# Patient Record
Sex: FEMALE | Race: OTHER | Employment: FULL TIME | URBAN - METROPOLITAN AREA
[De-identification: names, ages, dates, MRNs, and addresses within clinical notes are randomized per-mention and may not be internally consistent; named-entity substitution may affect disease eponyms.]

---

## 2017-09-22 DIAGNOSIS — R56.9 CONVULSIONS, UNSPECIFIED CONVULSION TYPE (HCC): ICD-10-CM

## 2017-09-22 RX ORDER — LEVETIRACETAM 500 MG/1
1000 TABLET ORAL 2 TIMES DAILY
Qty: 120 TABLET | Refills: 5 | Status: SHIPPED | OUTPATIENT
Start: 2017-09-22 | End: 2018-06-11 | Stop reason: SDUPTHER

## 2017-10-20 ENCOUNTER — OFFICE VISIT (OUTPATIENT)
Dept: FAMILY MEDICINE CLINIC | Age: 27
End: 2017-10-20

## 2017-10-20 ENCOUNTER — TELEPHONE (OUTPATIENT)
Dept: FAMILY MEDICINE CLINIC | Age: 27
End: 2017-10-20

## 2017-10-20 VITALS
DIASTOLIC BLOOD PRESSURE: 82 MMHG | WEIGHT: 226 LBS | HEIGHT: 67 IN | BODY MASS INDEX: 35.47 KG/M2 | HEART RATE: 72 BPM | SYSTOLIC BLOOD PRESSURE: 124 MMHG | RESPIRATION RATE: 14 BRPM | TEMPERATURE: 98.6 F

## 2017-10-20 DIAGNOSIS — N91.5 OLIGOMENORRHEA, UNSPECIFIED TYPE: ICD-10-CM

## 2017-10-20 DIAGNOSIS — Z30.9 ENCOUNTER FOR CONTRACEPTIVE MANAGEMENT, UNSPECIFIED TYPE: ICD-10-CM

## 2017-10-20 DIAGNOSIS — R56.9 SEIZURES (HCC): Primary | ICD-10-CM

## 2017-10-20 DIAGNOSIS — R56.9 SEIZURES (HCC): ICD-10-CM

## 2017-10-20 DIAGNOSIS — J30.9 ALLERGIC RHINITIS, UNSPECIFIED CHRONICITY, UNSPECIFIED SEASONALITY, UNSPECIFIED TRIGGER: Primary | ICD-10-CM

## 2017-10-20 LAB
ALBUMIN SERPL-MCNC: 4.7 G/DL (ref 3.9–4.9)
ALP BLD-CCNC: 73 U/L (ref 40–130)
ALT SERPL-CCNC: 10 U/L (ref 0–33)
ANION GAP SERPL CALCULATED.3IONS-SCNC: 17 MEQ/L (ref 7–13)
AST SERPL-CCNC: 16 U/L (ref 0–35)
BILIRUB SERPL-MCNC: 0.3 MG/DL (ref 0–1.2)
BUN BLDV-MCNC: 11 MG/DL (ref 6–20)
CALCIUM SERPL-MCNC: 9.9 MG/DL (ref 8.6–10.2)
CHLORIDE BLD-SCNC: 103 MEQ/L (ref 98–107)
CO2: 24 MEQ/L (ref 22–29)
CREAT SERPL-MCNC: 0.56 MG/DL (ref 0.5–0.9)
GFR AFRICAN AMERICAN: >60
GFR NON-AFRICAN AMERICAN: >60
GLOBULIN: 2.8 G/DL (ref 2.3–3.5)
GLUCOSE BLD-MCNC: 77 MG/DL (ref 74–109)
HCT VFR BLD CALC: 37.8 % (ref 37–47)
HEMOGLOBIN: 11.6 G/DL (ref 12–16)
MCH RBC QN AUTO: 22.4 PG (ref 27–31.3)
MCHC RBC AUTO-ENTMCNC: 30.6 % (ref 33–37)
MCV RBC AUTO: 73.3 FL (ref 82–100)
PDW BLD-RTO: 17.2 % (ref 11.5–14.5)
PLATELET # BLD: 272 K/UL (ref 130–400)
POTASSIUM SERPL-SCNC: 4.4 MEQ/L (ref 3.5–5.1)
RBC # BLD: 5.15 M/UL (ref 4.2–5.4)
SODIUM BLD-SCNC: 144 MEQ/L (ref 132–144)
T4 FREE: 1.68 NG/DL (ref 0.93–1.7)
TOTAL PROTEIN: 7.5 G/DL (ref 6.4–8.1)
TSH SERPL DL<=0.05 MIU/L-ACNC: 0.66 UIU/ML (ref 0.27–4.2)
WBC # BLD: 6.5 K/UL (ref 4.8–10.8)

## 2017-10-20 PROCEDURE — 99214 OFFICE O/P EST MOD 30 MIN: CPT | Performed by: FAMILY MEDICINE

## 2017-10-20 RX ORDER — MEDROXYPROGESTERONE ACETATE 5 MG/1
5 TABLET ORAL DAILY
COMMUNITY
End: 2018-08-16

## 2017-10-20 ASSESSMENT — PATIENT HEALTH QUESTIONNAIRE - PHQ9
1. LITTLE INTEREST OR PLEASURE IN DOING THINGS: 0
2. FEELING DOWN, DEPRESSED OR HOPELESS: 0
SUM OF ALL RESPONSES TO PHQ QUESTIONS 1-9: 0
SUM OF ALL RESPONSES TO PHQ9 QUESTIONS 1 & 2: 0

## 2017-10-20 NOTE — PATIENT INSTRUCTIONS
Thank you for enrolling in [address] Please follow the instructions below to securely access your online medical record. vChatter allows you to send messages to your doctor, view your test results, renew your prescriptions, schedule appointments, and more. How Do I Sign Up? 1. In your Internet browser, go to [email or web address]  2. Click on the Sign Up Now link in the Sign In box. You will see the New Member Sign Up page. 3. Enter your vChatter Access Code exactly as it appears below. You will not need to use this code after youve completed the sign-up process. If you do not sign up before the expiration date, you must request a new code. vChatter Access Code: [email or web address]  Expires: 12/19/2017  2:24 PM    4. Enter your Social Security Number ([id number]) and Date of Birth (mm/dd/yyyy) as indicated and click Submit. You will be taken to the next sign-up page. 5. Create a vChatter ID. This will be your vChatter login ID and cannot be changed, so think of one that is secure and easy to remember. 6. Create a vChatter password. You can change your password at any time. 7. Enter your Password Reset Question and Answer. This can be used at a later time if you forget your password. 8. Enter your e-mail address. You will receive e-mail notification when new information is available in [address] 9. Click Sign Up. You can now view your medical record. Additional Information  If you have questions, please contact your physician practice where you receive care. Remember, vChatter is NOT to be used for urgent needs. For medical emergencies, dial 911.

## 2017-10-20 NOTE — PROGRESS NOTES
Subjective  Torsten Julian, 33 y.o. female presents today with:  Chief Complaint   Patient presents with    Check-Up     Wants depo -will do preg 11-2        Past Medical History:   Diagnosis Date    Seizures ([address]      Past Surgical History:   Procedure Laterality Date    TONSILLECTOMY  1998     Social History     Social History    Marital status: Single     Spouse name: N/A    Number of children: N/A    Years of education: N/A     Occupational History    Not on file. Social History Main Topics    Smoking status: Never Smoker    Smokeless tobacco: Never Used    Alcohol use No    Drug use: No    Sexual activity: Not on file     Other Topics Concern    Not on file     Social History Narrative    No narrative on file     Family History   Problem Relation Age of Onset    Diabetes Mother     High Blood Pressure Mother     Kidney Disease Mother     Seizures Father      Allergies   Allergen Reactions    Eggs [Egg White] Hives     Current Outpatient Prescriptions   Medication Sig Dispense Refill    medroxyPROGESTERone (PROVERA) 5 MG tablet Take 5 mg by mouth daily      levETIRAcetam (KEPPRA) 500 MG tablet Take 2 tablets by mouth 2 times daily 120 tablet 5    DHA-EPA-VITAMIN E PO Take 1 capsule by mouth       No current facility-administered medications for this visit. The patient denies any history of             heart attack or KNOWN CAD        or stroke. No chest pain, shortness of breath, paroxysmal nocturnal dyspnea. No nausea, vomiting, diarrhea, hematochezia or melena. No paresthesias or headaches. No dysuria, frequency or hematuria. Last labs  No visits with results within 3 Month(s) from this visit.    Latest known visit with results is:   Hospital Outpatient Visit on 09/12/2016   Component Date Value Ref Range Status    WBC 09/12/2016 6.2  4.8 - 10.8 K/uL Final    RBC 09/12/2016 5.19  4.20 - 5.40 M/uL Final    Hemoglobin 09/12/2016 11.6* 12.0 - 16.0 g/dL Final    Hematocrit 09/12/2016 36.8* 37.0 - 47.0 % Final    MCV 09/12/2016 70.9* 82.0 - 100.0 fL Final    MCH 09/12/2016 22.3* 27.0 - 31.3 pg Final    MCHC 09/12/2016 31.4* 33.0 - 37.0 % Final    RDW 09/12/2016 16.2* 11.5 - 14.5 % Final    Platelets 01/13/7622 265  130 - 400 K/uL Final    Neutrophils % 09/12/2016 66.0  % Final    Lymphocytes % 09/12/2016 24.0  % Final    Monocytes % 09/12/2016 7.3  % Final    Eosinophils % 09/12/2016 1.6  % Final    Basophils % 09/12/2016 1.1  % Final    Neutrophils # 09/12/2016 4.1  1.4 - 6.5 K/uL Final    Lymphocytes # 09/12/2016 1.5  1.0 - 4.8 K/uL Final    Monocytes # 09/12/2016 0.5  0.2 - 0.8 K/uL Final    Eosinophils # 09/12/2016 0.1  0.0 - 0.7 K/uL Final    Basophils # 09/12/2016 0.1  0.0 - 0.2 K/uL Final    Anisocytosis 09/12/2016 2+   Final    Microcytes 09/12/2016 1+   Final    Hypochromia 09/12/2016 1+   Final    Poikilocytes 09/12/2016 1+   Final    Target Cells 09/12/2016 Occasional   Final    Tear Drop Cells 09/12/2016 Occasional   Final    Sodium 09/12/2016 140  132 - 144 mEq/L Final    Potassium 09/12/2016 3.9  3.5 - 5.1 mEq/L Final    Chloride 09/12/2016 101  98 - 107 mEq/L Final    CO2 09/12/2016 25  22 - 29 mEq/L Final    Anion Gap 09/12/2016 14* 7 - 13 mEq/L Final    Glucose 09/12/2016 76  74 - 109 mg/dL Final    BUN 09/12/2016 10  6 - 20 mg/dL Final    CREATININE 09/12/2016 0.42* 0.50 - 0.90 mg/dL Final    GFR Non- 09/12/2016 >60.0  >60 Final    Comment: >60 mL/min/1.73m2 EGFR, calc. for ages 25 and older using the  MDRD formula (not corrected for weight), is valid for stable  renal function.  GFR  09/12/2016 >60.0  >60 Final    Comment: >60 mL/min/1.73m2 EGFR, calc. for ages 25 and older using the  MDRD formula (not corrected for weight), is valid for stable  renal function.       Calcium 09/12/2016 9.4  8.6 - 10.2 mg/dL Final    Total Protein 09/12/2016 7.1  6.4 - 8.1 g/dL Final    Alb Canals and ears normal   Eyes:  Extraocular eye motions intact and pain free. Pupils reactive/equal    Sclerae and conjunctivae clear    NECK: No masses or adenopathy palpable. No carotid bruits heard. No asymmetry visible. No thyromegaly. RESPIRATORY:   Clear/ Equal breath sounds /No acute respiratory distress. No wheezes,rales, or rhonchi. No percussive abnormalities    HEART: Regular rhythm without murmur, rub or gallop. ABDOMEN:  Soft, non tender. No masses, guarding or rebound. Normo active bowel sounds. EXTREMITIES:  No edema in any extremity. No cyanosis or clubbing. 2+ dorsalis pedis pulses bilaterally          Assessment & Plan   1. Seizures ([address]  Referral To Neurology - ([place]) Renetta Romberg, MD - [place]    CBC    Comprehensive Metabolic Panel    TSH without Reflex    T4, Free   2. Oligomenorrhea, unspecified type  CBC    TSH without Reflex    T4, Free     No orders of the defined types were placed in this encounter. No orders of the defined types were placed in this encounter. There are no discontinued medications. No Follow-up on file.   Breakthrough bleeding  Advised depo -already ordered by ob  Only aura minimal and last syncopal sz-6 y ago  Continue keppra  See neuro qyr  Offered prn med for sz-declines  Is now gluten free  Feels great  Lost wt    Tammy Hernandez MD

## 2018-06-11 DIAGNOSIS — R56.9 CONVULSIONS, UNSPECIFIED CONVULSION TYPE (HCC): ICD-10-CM

## 2018-06-11 RX ORDER — LEVETIRACETAM 500 MG/1
TABLET ORAL
Qty: 120 TABLET | Refills: 0 | Status: SHIPPED | OUTPATIENT
Start: 2018-06-11 | End: 2018-08-16 | Stop reason: SDUPTHER

## 2018-08-16 ENCOUNTER — OFFICE VISIT (OUTPATIENT)
Dept: FAMILY MEDICINE CLINIC | Age: 28
End: 2018-08-16
Payer: COMMERCIAL

## 2018-08-16 VITALS
BODY MASS INDEX: 34.53 KG/M2 | OXYGEN SATURATION: 98 % | HEART RATE: 63 BPM | RESPIRATION RATE: 16 BRPM | SYSTOLIC BLOOD PRESSURE: 120 MMHG | TEMPERATURE: 98 F | DIASTOLIC BLOOD PRESSURE: 68 MMHG | HEIGHT: 67 IN | WEIGHT: 220 LBS

## 2018-08-16 DIAGNOSIS — R56.9 CONVULSIONS, UNSPECIFIED CONVULSION TYPE (HCC): ICD-10-CM

## 2018-08-16 DIAGNOSIS — R41.840 INATTENTION: ICD-10-CM

## 2018-08-16 DIAGNOSIS — F43.9 STRESS: ICD-10-CM

## 2018-08-16 DIAGNOSIS — R56.9 SEIZURES (HCC): ICD-10-CM

## 2018-08-16 DIAGNOSIS — G47.09 OTHER INSOMNIA: ICD-10-CM

## 2018-08-16 DIAGNOSIS — F43.9 STRESS: Primary | ICD-10-CM

## 2018-08-16 PROCEDURE — 1036F TOBACCO NON-USER: CPT | Performed by: INTERNAL MEDICINE

## 2018-08-16 PROCEDURE — G8427 DOCREV CUR MEDS BY ELIG CLIN: HCPCS | Performed by: INTERNAL MEDICINE

## 2018-08-16 PROCEDURE — 99214 OFFICE O/P EST MOD 30 MIN: CPT | Performed by: INTERNAL MEDICINE

## 2018-08-16 PROCEDURE — G8417 CALC BMI ABV UP PARAM F/U: HCPCS | Performed by: INTERNAL MEDICINE

## 2018-08-16 RX ORDER — BUSPIRONE HYDROCHLORIDE 7.5 MG/1
7.5 TABLET ORAL 3 TIMES DAILY
Qty: 90 TABLET | Refills: 0 | Status: SHIPPED | OUTPATIENT
Start: 2018-08-16 | End: 2018-08-16 | Stop reason: SDUPTHER

## 2018-08-16 RX ORDER — BUSPIRONE HYDROCHLORIDE 7.5 MG/1
7.5 TABLET ORAL 3 TIMES DAILY
Qty: 90 TABLET | Refills: 0 | Status: SHIPPED | OUTPATIENT
Start: 2018-08-16

## 2018-08-16 RX ORDER — LEVETIRACETAM 500 MG/1
TABLET ORAL
Qty: 120 TABLET | Refills: 0 | Status: SHIPPED | OUTPATIENT
Start: 2018-08-16 | End: 2018-11-20 | Stop reason: SDUPTHER

## 2018-08-16 ASSESSMENT — ENCOUNTER SYMPTOMS
EYE PAIN: 0
BACK PAIN: 0
SHORTNESS OF BREATH: 0
ABDOMINAL PAIN: 0

## 2018-11-20 DIAGNOSIS — R56.9 CONVULSIONS, UNSPECIFIED CONVULSION TYPE (HCC): ICD-10-CM

## 2018-11-20 RX ORDER — LEVETIRACETAM 500 MG/1
TABLET ORAL
Qty: 60 TABLET | Refills: 0 | Status: SHIPPED | OUTPATIENT
Start: 2018-11-20 | End: 2019-01-19 | Stop reason: SDUPTHER

## 2019-01-19 DIAGNOSIS — R56.9 CONVULSIONS, UNSPECIFIED CONVULSION TYPE (HCC): ICD-10-CM

## 2019-01-21 RX ORDER — LEVETIRACETAM 500 MG/1
TABLET ORAL
Qty: 60 TABLET | Refills: 3 | Status: SHIPPED | OUTPATIENT
Start: 2019-01-21 | End: 2019-01-24 | Stop reason: SDUPTHER

## 2019-01-24 ENCOUNTER — TELEPHONE (OUTPATIENT)
Dept: FAMILY MEDICINE CLINIC | Age: 29
End: 2019-01-24

## 2019-01-24 DIAGNOSIS — Z00.00 PHYSICAL EXAM: Primary | ICD-10-CM

## 2019-01-24 DIAGNOSIS — R56.9 CONVULSIONS, UNSPECIFIED CONVULSION TYPE (HCC): ICD-10-CM

## 2019-01-24 DIAGNOSIS — R56.9 SEIZURES (HCC): ICD-10-CM

## 2019-01-25 RX ORDER — LEVETIRACETAM 500 MG/1
TABLET ORAL
Qty: 120 TABLET | Refills: 1 | Status: SHIPPED | OUTPATIENT
Start: 2019-01-25 | End: 2021-08-12 | Stop reason: SDUPTHER

## 2019-10-28 ENCOUNTER — TELEPHONE (OUTPATIENT)
Dept: ENDOCRINOLOGY | Age: 29
End: 2019-10-28

## 2020-12-28 ENCOUNTER — VIRTUAL VISIT (OUTPATIENT)
Dept: FAMILY MEDICINE CLINIC | Age: 30
End: 2020-12-28
Payer: COMMERCIAL

## 2020-12-28 DIAGNOSIS — Z91.89 AT INCREASED RISK OF EXPOSURE TO COVID-19 VIRUS: ICD-10-CM

## 2020-12-28 PROCEDURE — 99441 PR PHYS/QHP TELEPHONE EVALUATION 5-10 MIN: CPT | Performed by: PHYSICIAN ASSISTANT

## 2020-12-28 ASSESSMENT — ENCOUNTER SYMPTOMS
BACK PAIN: 0
DIARRHEA: 0
SHORTNESS OF BREATH: 0
COUGH: 0
EYE ITCHING: 0
EYE DISCHARGE: 0
CHEST TIGHTNESS: 0
EYE PAIN: 0
ABDOMINAL PAIN: 0
RHINORRHEA: 1
VOMITING: 0
TROUBLE SWALLOWING: 0
SINUS PRESSURE: 0

## 2020-12-28 NOTE — PROGRESS NOTES
TELEHEALTH EVALUATION -- Audio/Visual (During RDHNZ-73 public health emergency)    -   Víctor Bass is a 35 y.o. female being evaluated by a Virtual Visit (video visit) encounter to address concerns as mentioned above. A caregiver was present when appropriate. Due to this being a TeleHealth encounter (During MJOAQ-57 public health emergency), evaluation of the following organ systems was limited: Vitals/Constitutional/EENT/Resp/CV/GI//MS/Neuro/Skin/Heme-Lymph-Imm. Pursuant to the emergency declaration under the [facility], [address] [place] [address] authority and the [employer] Act, this Virtual Visit was conducted with patient's (and/or legal guardian's) consent, to reduce the patient's risk of exposure to COVID-19 and provide necessary medical care. The patient (and/or legal guardian) has also been advised to contact this office for worsening conditions or problems, and seek emergency medical treatment and/or call 911 if deemed necessary. Patient was contacted and agreed to proceed with a virtual visit via Telephone Visit  The risks and benefits of converting to a virtual visit were discussed in light of the current infectious disease epidemic. Patient also understood that insurance coverage and co-pays are up to their individual insurance plans. Patient was located at their home. Provider was located at their office. 2020  Víctor Bass (:  1990) has requested an audio/video evaluation for the following concern(s):    HPI  35year old female who took a home COVID-19 test which was positive. She complains of runny nose, stuffy nose, sneezing, body aches, swollen lymph. Denies cough, fever, and shortness of breath. She states that she cannot smell or taste anything. She bought Lucian's vapor rub and cannot smell it.        Review of Systems Constitutional: Positive for fatigue. Negative for activity change, appetite change, chills, fever and unexpected weight change. HENT: Positive for congestion and rhinorrhea. Negative for drooling, ear pain, nosebleeds, sinus pressure and trouble swallowing. Eyes: Negative for pain, discharge and itching. Respiratory: Negative for cough, chest tightness and shortness of breath. Cardiovascular: Negative for chest pain and leg swelling. Gastrointestinal: Negative for abdominal pain, diarrhea and vomiting. Endocrine: Negative for polydipsia and polyphagia. Genitourinary: Negative for dysuria, flank pain and frequency. Musculoskeletal: Positive for arthralgias. Negative for back pain and myalgias. Skin: Negative for pallor and rash. Neurological: Negative for syncope, weakness and headaches. Hematological: Does not bruise/bleed easily. Psychiatric/Behavioral: Negative for agitation, behavioral problems and confusion. All other systems reviewed and are negative. Prior to Visit Medications    Medication Sig Taking?  Authorizing Provider   vitamin B-6 (PYRIDOXINE) 25 MG tablet take 1 tablet by mouth for nausea/ vomiting in pregnancy three times daily for 30 days  Historical Provider, MD   levETIRAcetam (KEPPRA) 500 MG tablet TAKE 2 TABLETS BY MOUTH TWICE DAILY  Jocy Pittman MD   busPIRone (BUSPAR) 7.5 MG tablet Take 1 tablet by mouth 3 times daily  Porsha Red MD   DHA-EPA-VITAMIN E PO Take 1 capsule by mouth  Historical Provider, MD       Past Medical History:   Diagnosis Date    [address] [address]      Past Surgical History:   Procedure Laterality Date    TONSILLECTOMY  1998     Social History     Socioeconomic History    Marital status: Single     Spouse name: Not on file    Number of children: Not on file    Years of education: Not on file    Highest education level: Not on file   Occupational History    Not on file   Social Needs  Financial resource strain: Not on file    Food insecurity     Worry: Not on file     Inability: Not on file   uSamp needs     Medical: Not on file     Non-medical: Not on file   Tobacco Use    Smoking status: Never Smoker    Smokeless tobacco: Never Used   Substance and Sexual Activity    Alcohol use: No    Drug use: No    Sexual activity: Not on file   Lifestyle    Physical activity     Days per week: Not on file     Minutes per session: Not on file    Stress: Not on file   Relationships    Social connections     Talks on phone: Not on file     Gets together: Not on file     Attends Jewish service: Not on file     Active member of club or organization: Not on file     Attends meetings of clubs or organizations: Not on file     Relationship status: Not on file    Intimate partner violence     Fear of current or ex partner: Not on file     Emotionally abused: Not on file     Physically abused: Not on file     Forced sexual activity: Not on file   Other Topics Concern    Not on file   Social History Narrative    Not on file     Family History   Problem Relation Age of Onset    Diabetes Mother     High Blood Pressure Mother     Kidney Disease Mother     Seizures Father      Allergies   Allergen Reactions    Eggs [Egg White] Hives       PMH, Surgical Hx, Family Hx, and Social Hx reviewed and updated. Health Maintenance reviewed.     PHYSICAL EXAMINATION:  \"[x]\" Indicates a positive item  \"[]\" Indicates a negative item    Vital Signs: (As obtained by patient/caregiver or practitioner observation)    Blood pressure-  Heart rate-    Respiratory rate-    Temperature-  Pulse oximetry-     Constitutional: [x] Appears well-developed and well-nourished [x] No apparent distress      [] Abnormal-   Mental status  [x] Alert and awake  [x] Oriented to person/place/time [x]Able to follow commands      Eyes:  EOM    []  Normal  [] Abnormal-  Sclera  [x]  Normal  [] Abnormal - Result Value Ref Range    TSH 0.657 0.270 - 4.200 uIU/mL   T4, Free   Result Value Ref Range    T4 Free 1.68 0.93 - 1.70 ng/dL       ASSESSMENT/PLAN:  Assessment & Plan   There are no diagnoses linked to this encounter. No orders of the defined types were placed in this encounter. No orders of the defined types were placed in this encounter. There are no discontinued medications. No follow-ups on file. Reviewed with the patient: current clinical status, medications, activities and diet. Side effects, adverse effects of the medication prescribed today, as well as treatment plan/ rationale and result expectations have been discussed with the patient who expresses understanding and desires to proceed. Close follow up to evaluate treatment results and for coordination of care. I have reviewed the patient's medical history in detail and updated the computerized patient record. Patient identification was verified at the start of the visit: Yes    Total time spent on this encounter: Not billed by time      --MAX Mcmillan on 12/28/2020 at 3:25 PM    An electronic signature was used to authenticate this note.

## 2020-12-29 LAB
SARS-COV-2: DETECTED
SOURCE: ABNORMAL

## 2021-02-19 ENCOUNTER — HOSPITAL ENCOUNTER (EMERGENCY)
Age: 31
Discharge: HOME | End: 2021-02-19
Payer: COMMERCIAL

## 2021-02-19 VITALS
TEMPERATURE: 97.1 F | DIASTOLIC BLOOD PRESSURE: 84 MMHG | OXYGEN SATURATION: 99 % | WEIGHT: 250 LBS | HEIGHT: 67 IN | SYSTOLIC BLOOD PRESSURE: 119 MMHG | BODY MASS INDEX: 39.24 KG/M2 | HEART RATE: 75 BPM | RESPIRATION RATE: 17 BRPM

## 2021-02-19 DIAGNOSIS — Z51.81 ENCOUNTER FOR THERAPEUTIC DRUG LEVEL MONITORING: Primary | ICD-10-CM

## 2021-02-19 DIAGNOSIS — R56.9 SEIZURES (HCC): ICD-10-CM

## 2021-02-19 PROCEDURE — 80177 DRUG SCRN QUAN LEVETIRACETAM: CPT

## 2021-02-19 PROCEDURE — 99283 EMERGENCY DEPT VISIT LOW MDM: CPT

## 2021-02-19 ASSESSMENT — ENCOUNTER SYMPTOMS
COUGH: 0
BACK PAIN: 0
ABDOMINAL PAIN: 0
SHORTNESS OF BREATH: 0

## 2021-02-19 NOTE — ED PROVIDER NOTES
[facility] ED  eMERGENCY dEPARTMENT eNCOUnter      Pt Name: Gloria Gore  MRN: [id number]  [place] 1990  Date of evaluation: 2/19/2021  Provider: SOPHIA Freedman CNP      HISTORY OF PRESENT ILLNESS    Gloria Gore is a 35 y.o. female who presents to the Emergency Department with request to have [address] level drawn. Patient was told to come to ER by her OB to have her level drawn since her office is an hour away. Patient has been taking her Keppra as scheduled. She denies any seizure activity. Patient is 32 weeks pregnant. REVIEW OF SYSTEMS       Review of Systems   Constitutional: Negative for fever. HENT: Negative for congestion. Respiratory: Negative for cough and shortness of breath. Cardiovascular: Negative for chest pain. Gastrointestinal: Negative for abdominal pain. Genitourinary: Negative for dysuria. Musculoskeletal: Negative for arthralgias and back pain. Skin: Negative for rash. Neurological: Negative for dizziness, seizures, syncope, weakness, light-headedness and headaches. All other systems reviewed and are negative.         PAST MEDICAL HISTORY     Past Medical History:   Diagnosis Date    Seizures ([address]          SURGICAL HISTORY       Past Surgical History:   Procedure Laterality Date    TONSILLECTOMY  1998         CURRENT MEDICATIONS       Previous Medications    BUSPIRONE (BUSPAR) 7.5 MG TABLET    Take 1 tablet by mouth 3 times daily    DHA-EPA-VITAMIN E PO    Take 1 capsule by mouth    LEVETIRACETAM (KEPPRA) 500 MG TABLET    TAKE 2 TABLETS BY MOUTH TWICE DAILY    VITAMIN B-6 (PYRIDOXINE) 25 MG TABLET    take 1 tablet by mouth for nausea/ vomiting in pregnancy three times daily for 30 days       ALLERGIES     Eggs [egg white]    FAMILY HISTORY       Family History   Problem Relation Age of Onset    Diabetes Mother     High Blood Pressure Mother     Kidney Disease Mother     Seizures Father           SOCIAL HISTORY Social History     Socioeconomic History    Marital status: Single     Spouse name: None    Number of children: None    Years of education: None    Highest education level: None   Occupational History    None   Social Needs    Financial resource strain: None    Food insecurity     Worry: None     Inability: None    Transportation needs     Medical: None     Non-medical: None   Tobacco Use    Smoking status: Never Smoker    Smokeless tobacco: Never Used   Substance and Sexual Activity    Alcohol use: No    Drug use: No    Sexual activity: None   Lifestyle    Physical activity     Days per week: None     Minutes per session: None    Stress: None   Relationships    Social connections     Talks on phone: None     Gets together: None     Attends Yazidi service: None     Active member of club or organization: None     Attends meetings of clubs or organizations: None     Relationship status: None    Intimate partner violence     Fear of current or ex partner: None     Emotionally abused: None     Physically abused: None     Forced sexual activity: None   Other Topics Concern    None   Social History Narrative    None       SCREENINGS      @FLOW([id number])@      PHYSICAL EXAM    (up to 7 for level 4, 8 or more for level 5)     ED Triage Vitals [02/19/21 1726]   BP Temp Temp Source Pulse Resp SpO2 Height Weight   119/84 97.1 °F (36.2 °C) Temporal 75 17 99 % 5' 7\" (1.702 m) 250 lb (113.4 kg)       Physical Exam  Vitals signs and nursing note reviewed. Constitutional:       Appearance: She is well-developed. HENT:      Head: Normocephalic and atraumatic. Right Ear: External ear normal.      Left Ear: External ear normal.      Nose: Nose normal.      Mouth/Throat:      Lips: Pink. Mouth: Mucous membranes are moist.      Pharynx: Oropharynx is clear. Uvula midline. Eyes:      Conjunctiva/sclera: Conjunctivae normal.      Pupils: Pupils are equal, round, and reactive to light.    Neck:

## 2021-02-19 NOTE — ED TRIAGE NOTES
Pt presents to ED from home to have her keppra level drawn. Pt states that her doctor is 1 hour away and she did not want to drive there to have her levels drawn. Pt is also requesting a neurology referral.   Upon assessment, pt is A/Ox4, skin appropriate for ethnicity/w/d, resp even and unlabored, msp's intact. Pt denies cp, sob, n/v/d, fever, or chills.

## 2021-02-23 LAB — KEPPRA: 5 UG/ML (ref 12–46)

## 2021-08-10 PROBLEM — Z12.4 SCREENING FOR MALIGNANT NEOPLASM OF CERVIX: Status: ACTIVE | Noted: 2021-08-10

## 2021-08-10 PROBLEM — R51.9 FREQUENT HEADACHES: Status: ACTIVE | Noted: 2021-08-10

## 2021-08-12 ENCOUNTER — OFFICE VISIT (OUTPATIENT)
Dept: NEUROLOGY | Age: 31
End: 2021-08-12
Payer: COMMERCIAL

## 2021-08-12 VITALS
DIASTOLIC BLOOD PRESSURE: 80 MMHG | SYSTOLIC BLOOD PRESSURE: 132 MMHG | WEIGHT: 249.5 LBS | BODY MASS INDEX: 39.08 KG/M2 | HEART RATE: 68 BPM

## 2021-08-12 DIAGNOSIS — R56.9 SEIZURES (HCC): Primary | ICD-10-CM

## 2021-08-12 DIAGNOSIS — F98.8 ATTENTION DEFICIT DISORDER, UNSPECIFIED HYPERACTIVITY PRESENCE: ICD-10-CM

## 2021-08-12 DIAGNOSIS — Z76.89 ENCOUNTER TO ESTABLISH CARE: ICD-10-CM

## 2021-08-12 DIAGNOSIS — R56.9 CONVULSIONS, UNSPECIFIED CONVULSION TYPE (HCC): ICD-10-CM

## 2021-08-12 PROCEDURE — G8417 CALC BMI ABV UP PARAM F/U: HCPCS | Performed by: NURSE PRACTITIONER

## 2021-08-12 PROCEDURE — 99204 OFFICE O/P NEW MOD 45 MIN: CPT | Performed by: NURSE PRACTITIONER

## 2021-08-12 PROCEDURE — G8427 DOCREV CUR MEDS BY ELIG CLIN: HCPCS | Performed by: NURSE PRACTITIONER

## 2021-08-12 PROCEDURE — 1036F TOBACCO NON-USER: CPT | Performed by: NURSE PRACTITIONER

## 2021-08-12 RX ORDER — IBUPROFEN 800 MG/1
TABLET ORAL
COMMUNITY
Start: 2021-06-24

## 2021-08-12 RX ORDER — LEVETIRACETAM 500 MG/1
TABLET ORAL
Qty: 120 TABLET | Refills: 3 | Status: SHIPPED | OUTPATIENT
Start: 2021-08-12 | End: 2021-12-15

## 2021-08-12 ASSESSMENT — ENCOUNTER SYMPTOMS
ABDOMINAL DISTENTION: 0
COUGH: 0
CHEST TIGHTNESS: 0
NAUSEA: 0
WHEEZING: 0
VOMITING: 0
COLOR CHANGE: 0
SHORTNESS OF BREATH: 0
TROUBLE SWALLOWING: 0
ABDOMINAL PAIN: 0

## 2021-08-12 NOTE — PROGRESS NOTES
Subjective:      Patient ID: Ty Torres is a 35 y.o. female who presents today for:  Chief Complaint   Patient presents with    New Patient     Pt states that she has been on keppra since she was 26. Pt states last seizure was 11 years ago. She states that here recenlty she is having a hard time focusing. She will start on a project and then go to do something else.  Headache     patient feels that she is also getting stress headaches. HPI  Pt seen and examined in the office to establish care for seizures. Patient is a 52-year-old female with past medical history of seizures who presents today to establish care. Patient reports that she has had seizures for approximately 20 years. They started at the age of 6 and occur mainly with her menstrual cycle. She states she has never had a seizure outside of her menstrual cycle. Seizures are generalized. She has no history of status epilepticus. No loss of bowel and bladder. She does have a postictal state. She has been on Keppra for many years. Last seizure was approximately 11 years ago. Seizures are provoked by lack of sleep, stress, menstrual cycle. She denies history of significant head trauma, CNS infection, CVA, intracranial hemorrhage, CNS tumor, significant febrile illness as a child. Patient requested to be evaluated for attention deficit disorder today.   Past Medical History:   Diagnosis Date    Seizures [facility])      Past Surgical History:   Procedure Laterality Date    TONSILLECTOMY  1998     Social History     Socioeconomic History    Marital status: Single     Spouse name: Not on file    Number of children: Not on file    Years of education: Not on file    Highest education level: Not on file   Occupational History    Not on file   Tobacco Use    Smoking status: Never Smoker    Smokeless tobacco: Never Used   Substance and Sexual Activity    Alcohol use: No    Drug use: No    Sexual activity: Not on file   Other Topics Concern    Not on file   Social History Narrative    Not on file     Social Determinants of Health     Financial Resource Strain:     Difficulty of Paying Living Expenses:    Food Insecurity:     Worried About Running Out of Food in the Last Year:     [address] in the Last Year:    Transportation Needs:     Lack of Transportation (Medical):  Lack of Transportation (Non-Medical):    Physical Activity:     Days of Exercise per Week:     Minutes of Exercise per Session:    Stress:     Feeling of Stress :    Social Connections:     Frequency of Communication with Friends and Family:     Frequency of Social Gatherings with Friends and Family:     Attends [employer] Services:     Active Member of Clubs or Organizations:     Attends Club or Organization Meetings:     Marital Status:    Intimate Partner Violence:     Fear of Current or Ex-Partner:     Emotionally Abused:     Physically Abused:     Sexually Abused:      Family History   Problem Relation Age of Onset    Diabetes Mother     High Blood Pressure Mother     Kidney Disease Mother     Seizures Father      Allergies   Allergen Reactions    Eggs [Egg White] Hives    Hydrocodone-Acetaminophen      Current Outpatient Medications on File Prior to Visit   Medication Sig Dispense Refill    ibuprofen (ADVIL;MOTRIN) 800 MG tablet       vitamin B-6 (PYRIDOXINE) 25 MG tablet take 1 tablet by mouth for nausea/ vomiting in pregnancy three times daily for 30 days      busPIRone (BUSPAR) 7.5 MG tablet Take 1 tablet by mouth 3 times daily (Patient not taking: Reported on 8/12/2021) 90 tablet 0    DHA-EPA-VITAMIN E PO Take 1 capsule by mouth (Patient not taking: Reported on 8/12/2021)       No current facility-administered medications on file prior to visit. Review of Systems   Constitutional: Negative for appetite change, chills, fatigue and fever. HENT: Negative for hearing loss and trouble swallowing.     Eyes: Negative for visual disturbance. Respiratory: Negative for cough, chest tightness, shortness of breath and wheezing. Cardiovascular: Negative for chest pain, palpitations and leg swelling. Gastrointestinal: Negative for abdominal distention, abdominal pain, nausea and vomiting. Genitourinary: Negative for difficulty urinating. Musculoskeletal: Negative for gait problem. Skin: Negative for color change and rash. Neurological: Negative for dizziness, tremors, seizures, syncope, facial asymmetry, speech difficulty, weakness, light-headedness, numbness and headaches. Psychiatric/Behavioral: Positive for decreased concentration. Negative for agitation, confusion and hallucinations. The patient is not nervous/anxious. Objective:   /80 (Site: Left Upper Arm, Position: Sitting, Cuff Size: Large Adult)   Pulse 68   Wt 249 lb 8 oz (113.2 kg)   LMP 08/08/2018 (Approximate)   Breastfeeding Unknown   BMI 39.08 kg/m²     Physical Exam  Vitals reviewed. Constitutional:       General: She is not in acute distress. Appearance: She is not ill-appearing or diaphoretic. HENT:      Head: Normocephalic and atraumatic. Eyes:      General: No visual field deficit. Extraocular Movements: Extraocular movements intact. Pupils: Pupils are equal, round, and reactive to light. Cardiovascular:      Rate and Rhythm: Normal rate and regular rhythm. Pulmonary:      Effort: Pulmonary effort is normal. No respiratory distress. Breath sounds: Normal breath sounds. Abdominal:      General: Bowel sounds are normal.      Palpations: Abdomen is soft. Skin:     General: Skin is warm and dry. Neurological:      General: No focal deficit present. Mental Status: She is alert and oriented to person, place, and time. Cranial Nerves: No cranial nerve deficit, dysarthria or facial asymmetry. Motor: No weakness, tremor, atrophy, abnormal muscle tone, seizure activity or pronator drift. Coordination: Romberg sign negative. Coordination normal. Finger-Nose-Finger Test normal.      Gait: Gait normal.      Deep Tendon Reflexes:      Reflex Scores:       Patellar reflexes are 2+ on the right side and 2+ on the left side. No results found. Lab Results   Component Value Date    WBC 6.5 10/20/2017    RBC 5.15 10/20/2017    RBC 5.23 03/16/2012    HGB 11.6 10/20/2017    HCT 37.8 10/20/2017    MCV 73.3 10/20/2017    MCH 22.4 10/20/2017    MCHC 30.6 10/20/2017    RDW 17.2 10/20/2017     10/20/2017    MPV 8.7 05/01/2015     Lab Results   Component Value Date     10/20/2017    K 4.4 10/20/2017     10/20/2017    CO2 24 10/20/2017    BUN 11 10/20/2017    CREATININE 0.56 10/20/2017    GFRAA >60.0 10/20/2017    LABGLOM >60.0 10/20/2017    GLUCOSE 77 10/20/2017    GLUCOSE 86 03/16/2012    PROT 7.5 10/20/2017    LABALBU 4.7 10/20/2017    LABALBU 4.7 03/16/2012    CALCIUM 9.9 10/20/2017    BILITOT 0.3 10/20/2017    ALKPHOS 73 10/20/2017    AST 16 10/20/2017    ALT 10 10/20/2017     No results found for: PROTIME, INR  Lab Results   Component Value Date    TSH 0.657 10/20/2017     Lab Results   Component Value Date    TRIG 70 09/12/2016    HDL 57 09/12/2016    LDLCALC 48 09/12/2016     No results found for: Daniel Stable, LABBENZ, CANNAB, COCAINESCRN, LABMETH, OPIATESCREENURINE, PHENCYCLIDINESCREENURINE, PPXUR, ETOH  No results found for: LITHIUM, DILFRTOT, VALPROATE    Assessment and Plan:      1. Seizures ([address]  -Patient presents today to establish care for seizures. She has had seizures since the approximate age of 6. Seizures occur around menstrual cycle. Last seizure was 11 years ago. She has been on Keppra for many years. She is tolerating Keppra without agitation or mood changes.   Patient is inquiring if her daughters are at risk for catamenial seizures as well.  -At this time we will continue her on Keppra and assess levetiracetam level for baseline.  - Levetiracetam Level; Future    2. Convulsions, unspecified convulsion type ([facility] [address]  - levETIRAcetam (KEPPRA) 500 MG tablet; TAKE 2 TABLETS BY MOUTH TWICE DAILY  Dispense: 120 tablet; Refill: 3    3. Attention deficit disorder, unspecified hyperactivity presence  -Patient concerned about concentration issues and wishes to be evaluated for ADD. I will refer her to the provider in our practice who treats this. 4. Encounter to establish care        No follow-ups on file.     Leny Varghese, APRN - CNP     Collaborating Physician Dr Miguel Angel Medrano

## 2021-09-09 PROBLEM — Z12.4 SCREENING FOR MALIGNANT NEOPLASM OF CERVIX: Status: RESOLVED | Noted: 2021-08-10 | Resolved: 2021-09-09

## 2021-10-14 PROBLEM — R56.9 CONVULSIONS (HCC): Status: ACTIVE | Noted: 2021-10-14

## 2021-10-14 PROBLEM — F90.9 ADHD (ATTENTION DEFICIT HYPERACTIVITY DISORDER): Status: ACTIVE | Noted: 2021-10-14

## 2021-12-15 DIAGNOSIS — R56.9 CONVULSIONS, UNSPECIFIED CONVULSION TYPE (HCC): ICD-10-CM

## 2021-12-15 RX ORDER — LEVETIRACETAM 500 MG/1
TABLET ORAL
Qty: 120 TABLET | Refills: 0 | Status: SHIPPED | OUTPATIENT
Start: 2021-12-15 | End: 2022-02-28 | Stop reason: SDUPTHER

## 2022-02-28 DIAGNOSIS — R56.9 CONVULSIONS, UNSPECIFIED CONVULSION TYPE (HCC): ICD-10-CM

## 2022-02-28 RX ORDER — LEVETIRACETAM 500 MG/1
TABLET ORAL
Qty: 120 TABLET | Refills: 0 | Status: SHIPPED | OUTPATIENT
Start: 2022-02-28 | End: 2022-05-10 | Stop reason: SDUPTHER

## 2022-05-10 DIAGNOSIS — R56.9 CONVULSIONS, UNSPECIFIED CONVULSION TYPE (HCC): ICD-10-CM

## 2022-05-10 RX ORDER — LEVETIRACETAM 500 MG/1
1000 TABLET ORAL 2 TIMES DAILY
Qty: 120 TABLET | Refills: 2 | Status: SHIPPED | OUTPATIENT
Start: 2022-05-10 | End: 2022-08-11

## 2022-06-01 ENCOUNTER — OFFICE VISIT (OUTPATIENT)
Dept: NEUROLOGY | Age: 32
End: 2022-06-01
Payer: COMMERCIAL

## 2022-06-01 VITALS
HEIGHT: 67 IN | HEART RATE: 94 BPM | SYSTOLIC BLOOD PRESSURE: 110 MMHG | DIASTOLIC BLOOD PRESSURE: 82 MMHG | BODY MASS INDEX: 37.04 KG/M2 | WEIGHT: 236 LBS

## 2022-06-01 DIAGNOSIS — R51.9 FREQUENT HEADACHES: ICD-10-CM

## 2022-06-01 DIAGNOSIS — R56.9 SEIZURE (HCC): Primary | ICD-10-CM

## 2022-06-01 DIAGNOSIS — F90.0 ATTENTION DEFICIT HYPERACTIVITY DISORDER (ADHD), PREDOMINANTLY INATTENTIVE TYPE: ICD-10-CM

## 2022-06-01 PROCEDURE — G8417 CALC BMI ABV UP PARAM F/U: HCPCS | Performed by: PSYCHIATRY & NEUROLOGY

## 2022-06-01 PROCEDURE — 1036F TOBACCO NON-USER: CPT | Performed by: PSYCHIATRY & NEUROLOGY

## 2022-06-01 PROCEDURE — 99215 OFFICE O/P EST HI 40 MIN: CPT | Performed by: PSYCHIATRY & NEUROLOGY

## 2022-06-01 PROCEDURE — G8427 DOCREV CUR MEDS BY ELIG CLIN: HCPCS | Performed by: PSYCHIATRY & NEUROLOGY

## 2022-06-01 RX ORDER — DEXTROAMPHETAMINE SACCHARATE, AMPHETAMINE ASPARTATE MONOHYDRATE, DEXTROAMPHETAMINE SULFATE AND AMPHETAMINE SULFATE 5; 5; 5; 5 MG/1; MG/1; MG/1; MG/1
20 CAPSULE, EXTENDED RELEASE ORAL EVERY MORNING
Qty: 30 CAPSULE | Refills: 0 | Status: SHIPPED | OUTPATIENT
Start: 2022-06-01 | End: 2022-06-04 | Stop reason: SDUPTHER

## 2022-06-01 ASSESSMENT — ENCOUNTER SYMPTOMS
TROUBLE SWALLOWING: 0
VOMITING: 0
SHORTNESS OF BREATH: 0
NAUSEA: 0
BACK PAIN: 0
PHOTOPHOBIA: 0
COLOR CHANGE: 0
CHOKING: 0

## 2022-06-01 NOTE — PROGRESS NOTES
Subjective:      Patient ID: Yessenia Sanchez is a 28 y.o. female who presents today for:  Chief Complaint   Patient presents with    Follow-up     ADHD evalulation per Maria Guadalupe Amaral, very forgetful, focus is also very bad. HPI 81-year-old right-handed female with a history of seizure disorder with ADD. Is my first counter with the patient as patient was seen by different providers. Patient had seizures for almost 20 years and started at the age of 6 and occur during menstrual cycles. Been on Keppra for many years. Seizures approved by sleep depression stressed menstrual cycles. Seizure documented 12 years ago. She is tolerating Keppra very well without agitation or mood changes. She has essential deficit disorder is not been on any medication. She is having considerable issues with focusing. ADD scores were reviewed and she actually does have suggestion of attention deficit disorder from the history as well as evaluation. And appears very fidgety and was noted to be like this when she was younger she is otherwise did well in school. She still has difficulty completing her tasks. She has no anger behaviors or rage or difficulty functioning. She does have insomnia though this is mostly she is hyperactive.     Past Medical History:   Diagnosis Date    Seizures [facility])      Past Surgical History:   Procedure Laterality Date    TONSILLECTOMY  1998     Social History     Socioeconomic History    Marital status: Single     Spouse name: Not on file    Number of children: Not on file    Years of education: Not on file    Highest education level: Not on file   Occupational History    Not on file   Tobacco Use    Smoking status: Never Smoker    Smokeless tobacco: Never Used   Substance and Sexual Activity    Alcohol use: No    Drug use: No    Sexual activity: Not on file   Other Topics Concern    Not on file   Social History Narrative    Not on file     Social Determinants of Health     Financial Resource Strain:     Difficulty of Paying Living Expenses: Not on file   Food Insecurity:     Worried About Running Out of Food in the Last Year: Not on file    [place] of Food in the Last Year: Not on file   Transportation Needs:     Lack of Transportation (Medical): Not on file    Lack of Transportation (Non-Medical):  Not on file   Physical Activity:     Days of Exercise per Week: Not on file    Minutes of Exercise per Session: Not on file   Stress:     Feeling of Stress : Not on file   Social Connections:     Frequency of Communication with Friends and Family: Not on file    Frequency of Social Gatherings with Friends and Family: Not on file    Attends Hoahaoism Services: Not on file    Active Member of [address] [place] [address] [employer] or Organizations: Not on file    Attends Club or Organization Meetings: Not on file    Marital Status: Not on file   Intimate Partner Violence:     Fear of Current or Ex-Partner: Not on file    Emotionally Abused: Not on file    Physically Abused: Not on file    Sexually Abused: Not on file   Housing Stability:     Unable to Pay for Housing in the Last Year: Not on file    Number of [place] in the Last Year: Not on file    Unstable Housing in the Last Year: Not on file     Family History   Problem Relation Age of Onset    Diabetes Mother     High Blood Pressure Mother     Kidney Disease Mother     Seizures Father      Allergies   Allergen Reactions    Eggs [Egg White] Hives    Hydrocodone-Acetaminophen        Current Outpatient Medications   Medication Sig Dispense Refill    levETIRAcetam (KEPPRA) 500 MG tablet Take 2 tablets by mouth 2 times daily 120 tablet 2    ibuprofen (ADVIL;MOTRIN) 800 MG tablet       vitamin B-6 (PYRIDOXINE) 25 MG tablet take 1 tablet by mouth for nausea/ vomiting in pregnancy three times daily for 30 days      busPIRone (BUSPAR) 7.5 MG tablet Take 1 tablet by mouth 3 times daily (Patient not taking: Reported on 8/12/2021) 90 tablet 0    DHA-EPA-VITAMIN E PO Take 1 capsule by mouth (Patient not taking: Reported on 8/12/2021)       No current facility-administered medications for this visit. Review of Systems   Constitutional: Negative for fever. HENT: Negative for ear pain, tinnitus and trouble swallowing. Eyes: Negative for photophobia and visual disturbance. Respiratory: Negative for choking and shortness of breath. Cardiovascular: Negative for chest pain and palpitations. Gastrointestinal: Negative for nausea and vomiting. Musculoskeletal: Negative for back pain, gait problem, joint swelling, myalgias, neck pain and neck stiffness. Skin: Negative for color change. Allergic/Immunologic: Negative for food allergies. Neurological: Negative for dizziness, tremors, seizures, syncope, facial asymmetry, speech difficulty, weakness, light-headedness, numbness and headaches. Psychiatric/Behavioral: Negative for behavioral problems, confusion, hallucinations and sleep disturbance. Objective:   /82 (Site: Left Upper Arm, Position: Sitting, Cuff Size: Medium Adult)   Pulse 94   Ht 5' 7\" (1.702 m)   Wt 236 lb (107 kg)   LMP 08/08/2018 (Approximate)   BMI 36.96 kg/m²     Physical Exam  Vitals reviewed. Eyes:      Pupils: Pupils are equal, round, and reactive to light. Cardiovascular:      Rate and Rhythm: Normal rate and regular rhythm. Heart sounds: No murmur heard. Pulmonary:      Effort: Pulmonary effort is normal.      Breath sounds: Normal breath sounds. Abdominal:      General: Bowel sounds are normal.   Musculoskeletal:         General: Normal range of motion. Cervical back: Normal range of motion. Skin:     General: Skin is warm. Neurological:      Mental Status: She is alert and oriented to person, place, and time. Cranial Nerves: No cranial nerve deficit. Sensory: No sensory deficit. Motor: No abnormal muscle tone.       Coordination: Coordination normal.      Deep patient's arrival as she was followed by different provider. During laceration there was some question raised regarding ADD given her history. We reviewed her history and patient 20 falls into the adult scale of attention deficit disorder. She has been aware of this for many years but has not been treated. Recommended that we start her on Adderall Exar 20 mg. We did discuss that occasionally the combination of Keppra and Adderall can make patient more hyperactive and she will let me know if this occurs. There is no other contraindication to use of this. There is a small percentage of patients with seizures though again patient is already covered for the same with a high dose of Keppra. She will let me know if there are any side effects. This is an extended evaluation as chart review done prior to patient's arrival to the office given that she was followed by different provider. OARRS reports are checked      Plan:      No orders of the defined types were placed in this encounter. No orders of the defined types were placed in this encounter. No follow-ups on file.       Braden Moran MD

## 2022-06-03 DIAGNOSIS — F90.0 ATTENTION DEFICIT HYPERACTIVITY DISORDER (ADHD), PREDOMINANTLY INATTENTIVE TYPE: ICD-10-CM

## 2022-06-03 NOTE — TELEPHONE ENCOUNTER
Patient called, stated Adderall XR 20 on backorder at [facility].  New rx pended to send to [facility].

## 2022-06-04 RX ORDER — DEXTROAMPHETAMINE SACCHARATE, AMPHETAMINE ASPARTATE MONOHYDRATE, DEXTROAMPHETAMINE SULFATE AND AMPHETAMINE SULFATE 5; 5; 5; 5 MG/1; MG/1; MG/1; MG/1
20 CAPSULE, EXTENDED RELEASE ORAL EVERY MORNING
Qty: 30 CAPSULE | Refills: 0 | Status: SHIPPED | OUTPATIENT
Start: 2022-06-04 | End: 2022-07-01 | Stop reason: SDUPTHER

## 2022-07-01 DIAGNOSIS — F90.0 ATTENTION DEFICIT HYPERACTIVITY DISORDER (ADHD), PREDOMINANTLY INATTENTIVE TYPE: ICD-10-CM

## 2022-07-02 RX ORDER — DEXTROAMPHETAMINE SACCHARATE, AMPHETAMINE ASPARTATE MONOHYDRATE, DEXTROAMPHETAMINE SULFATE AND AMPHETAMINE SULFATE 5; 5; 5; 5 MG/1; MG/1; MG/1; MG/1
20 CAPSULE, EXTENDED RELEASE ORAL EVERY MORNING
Qty: 30 CAPSULE | Refills: 0 | Status: SHIPPED | OUTPATIENT
Start: 2022-07-03 | End: 2022-07-06 | Stop reason: SDUPTHER

## 2022-07-05 DIAGNOSIS — F90.0 ATTENTION DEFICIT HYPERACTIVITY DISORDER (ADHD), PREDOMINANTLY INATTENTIVE TYPE: ICD-10-CM

## 2022-07-06 RX ORDER — DEXTROAMPHETAMINE SACCHARATE, AMPHETAMINE ASPARTATE MONOHYDRATE, DEXTROAMPHETAMINE SULFATE AND AMPHETAMINE SULFATE 5; 5; 5; 5 MG/1; MG/1; MG/1; MG/1
20 CAPSULE, EXTENDED RELEASE ORAL EVERY MORNING
Qty: 30 CAPSULE | Refills: 0 | Status: SHIPPED | OUTPATIENT
Start: 2022-07-06 | End: 2022-08-03 | Stop reason: SDUPTHER

## 2022-08-02 DIAGNOSIS — F90.0 ATTENTION DEFICIT HYPERACTIVITY DISORDER (ADHD), PREDOMINANTLY INATTENTIVE TYPE: ICD-10-CM

## 2022-08-03 RX ORDER — DEXTROAMPHETAMINE SACCHARATE, AMPHETAMINE ASPARTATE MONOHYDRATE, DEXTROAMPHETAMINE SULFATE AND AMPHETAMINE SULFATE 5; 5; 5; 5 MG/1; MG/1; MG/1; MG/1
20 CAPSULE, EXTENDED RELEASE ORAL EVERY MORNING
Qty: 30 CAPSULE | Refills: 0 | Status: SHIPPED | OUTPATIENT
Start: 2022-08-03 | End: 2022-09-01 | Stop reason: SDUPTHER

## 2022-08-11 DIAGNOSIS — R56.9 CONVULSIONS, UNSPECIFIED CONVULSION TYPE (HCC): ICD-10-CM

## 2022-08-11 RX ORDER — LEVETIRACETAM 500 MG/1
1000 TABLET ORAL 2 TIMES DAILY
Qty: 120 TABLET | Refills: 0 | Status: SHIPPED | OUTPATIENT
Start: 2022-08-11 | End: 2022-08-24

## 2022-08-24 DIAGNOSIS — R56.9 CONVULSIONS, UNSPECIFIED CONVULSION TYPE (HCC): ICD-10-CM

## 2022-08-24 RX ORDER — LEVETIRACETAM 500 MG/1
1000 TABLET ORAL 2 TIMES DAILY
Qty: 120 TABLET | Refills: 0 | Status: SHIPPED | OUTPATIENT
Start: 2022-08-24 | End: 2022-10-19 | Stop reason: SDUPTHER

## 2022-09-01 DIAGNOSIS — F90.0 ATTENTION DEFICIT HYPERACTIVITY DISORDER (ADHD), PREDOMINANTLY INATTENTIVE TYPE: ICD-10-CM

## 2022-09-01 RX ORDER — DEXTROAMPHETAMINE SACCHARATE, AMPHETAMINE ASPARTATE MONOHYDRATE, DEXTROAMPHETAMINE SULFATE AND AMPHETAMINE SULFATE 5; 5; 5; 5 MG/1; MG/1; MG/1; MG/1
20 CAPSULE, EXTENDED RELEASE ORAL EVERY MORNING
Qty: 30 CAPSULE | Refills: 0 | Status: SHIPPED | OUTPATIENT
Start: 2022-09-01 | End: 2022-10-19 | Stop reason: SDUPTHER

## 2022-10-19 DIAGNOSIS — R56.9 CONVULSIONS, UNSPECIFIED CONVULSION TYPE (HCC): ICD-10-CM

## 2022-10-19 DIAGNOSIS — F90.0 ATTENTION DEFICIT HYPERACTIVITY DISORDER (ADHD), PREDOMINANTLY INATTENTIVE TYPE: ICD-10-CM

## 2022-10-19 RX ORDER — DEXTROAMPHETAMINE SACCHARATE, AMPHETAMINE ASPARTATE MONOHYDRATE, DEXTROAMPHETAMINE SULFATE AND AMPHETAMINE SULFATE 5; 5; 5; 5 MG/1; MG/1; MG/1; MG/1
20 CAPSULE, EXTENDED RELEASE ORAL EVERY MORNING
Qty: 30 CAPSULE | Refills: 0 | Status: SHIPPED | OUTPATIENT
Start: 2022-10-19 | End: 2022-11-18

## 2022-10-19 RX ORDER — LEVETIRACETAM 500 MG/1
1000 TABLET ORAL 2 TIMES DAILY
Qty: 120 TABLET | Refills: 4 | Status: SHIPPED | OUTPATIENT
Start: 2022-10-19 | End: 2023-03-18

## 2022-10-19 NOTE — TELEPHONE ENCOUNTER
Patient is requesting medication refill. Please approve or deny this request.    Rx requested:  Requested Prescriptions     Pending Prescriptions Disp Refills    amphetamine-dextroamphetamine (ADDERALL XR) 20 MG extended release capsule 30 capsule 0     Sig: Take 1 capsule by mouth every morning for 30 days.     levETIRAcetam (KEPPRA) 500 MG tablet 120 tablet 4     Sig: Take 2 tablets by mouth 2 times daily         Last Office Visit:   6/1/2022      Next Visit Date:  Future Appointments   Date Time Provider Nikkie Jalloh   3/2/2023  3:00 PM Génesis Stephenson MD [facility]

## 2022-11-17 DIAGNOSIS — F90.0 ATTENTION DEFICIT HYPERACTIVITY DISORDER (ADHD), PREDOMINANTLY INATTENTIVE TYPE: ICD-10-CM

## 2022-11-17 RX ORDER — DEXTROAMPHETAMINE SACCHARATE, AMPHETAMINE ASPARTATE MONOHYDRATE, DEXTROAMPHETAMINE SULFATE AND AMPHETAMINE SULFATE 5; 5; 5; 5 MG/1; MG/1; MG/1; MG/1
20 CAPSULE, EXTENDED RELEASE ORAL EVERY MORNING
Qty: 30 CAPSULE | Refills: 0 | Status: SHIPPED | OUTPATIENT
Start: 2022-11-17 | End: 2022-12-17

## 2022-11-17 NOTE — TELEPHONE ENCOUNTER
Requested Prescriptions     Pending Prescriptions Disp Refills    amphetamine-dextroamphetamine (ADDERALL XR) 20 MG extended release capsule 30 capsule 0     Sig: Take 1 capsule by mouth every morning for 30 days.

## 2022-12-16 DIAGNOSIS — F90.0 ATTENTION DEFICIT HYPERACTIVITY DISORDER (ADHD), PREDOMINANTLY INATTENTIVE TYPE: ICD-10-CM

## 2022-12-16 RX ORDER — DEXTROAMPHETAMINE SACCHARATE, AMPHETAMINE ASPARTATE MONOHYDRATE, DEXTROAMPHETAMINE SULFATE AND AMPHETAMINE SULFATE 5; 5; 5; 5 MG/1; MG/1; MG/1; MG/1
20 CAPSULE, EXTENDED RELEASE ORAL EVERY MORNING
Qty: 30 CAPSULE | Refills: 0 | Status: SHIPPED | OUTPATIENT
Start: 2022-12-16 | End: 2023-01-15

## 2023-01-12 DIAGNOSIS — F90.0 ATTENTION DEFICIT HYPERACTIVITY DISORDER (ADHD), PREDOMINANTLY INATTENTIVE TYPE: ICD-10-CM

## 2023-01-12 NOTE — TELEPHONE ENCOUNTER
Pt is requesting medication refill. Please approve or deny this request.    Rx requested:  Requested Prescriptions     Pending Prescriptions Disp Refills    amphetamine-dextroamphetamine (ADDERALL XR) 20 MG extended release capsule 30 capsule 0     Sig: Take 1 capsule by mouth every morning for 30 days.          Last Office Visit:   6/1/2022      Next Visit Date:  Future Appointments   Date Time Provider Nikkie Jalloh   3/2/2023  3:00 PM MD Humberto Triplett [facility] -

## 2023-01-13 RX ORDER — DEXTROAMPHETAMINE SACCHARATE, AMPHETAMINE ASPARTATE MONOHYDRATE, DEXTROAMPHETAMINE SULFATE AND AMPHETAMINE SULFATE 5; 5; 5; 5 MG/1; MG/1; MG/1; MG/1
20 CAPSULE, EXTENDED RELEASE ORAL EVERY MORNING
Qty: 30 CAPSULE | Refills: 0 | Status: SHIPPED | OUTPATIENT
Start: 2023-01-13 | End: 2023-02-12

## 2023-01-23 DIAGNOSIS — F90.0 ATTENTION DEFICIT HYPERACTIVITY DISORDER (ADHD), PREDOMINANTLY INATTENTIVE TYPE: Primary | ICD-10-CM

## 2023-01-24 RX ORDER — DEXTROAMPHETAMINE SACCHARATE, AMPHETAMINE ASPARTATE MONOHYDRATE, DEXTROAMPHETAMINE SULFATE AND AMPHETAMINE SULFATE 2.5; 2.5; 2.5; 2.5 MG/1; MG/1; MG/1; MG/1
20 CAPSULE, EXTENDED RELEASE ORAL DAILY
Qty: 60 CAPSULE | Refills: 0 | Status: SHIPPED | OUTPATIENT
Start: 2023-01-24 | End: 2023-02-23

## 2023-02-27 DIAGNOSIS — F90.0 ATTENTION DEFICIT HYPERACTIVITY DISORDER (ADHD), PREDOMINANTLY INATTENTIVE TYPE: ICD-10-CM

## 2023-02-27 RX ORDER — DEXTROAMPHETAMINE SACCHARATE, AMPHETAMINE ASPARTATE MONOHYDRATE, DEXTROAMPHETAMINE SULFATE AND AMPHETAMINE SULFATE 2.5; 2.5; 2.5; 2.5 MG/1; MG/1; MG/1; MG/1
20 CAPSULE, EXTENDED RELEASE ORAL DAILY
Qty: 60 CAPSULE | Refills: 0 | Status: SHIPPED | OUTPATIENT
Start: 2023-02-27 | End: 2023-03-29

## 2023-03-01 DIAGNOSIS — F90.0 ATTENTION DEFICIT HYPERACTIVITY DISORDER (ADHD), PREDOMINANTLY INATTENTIVE TYPE: ICD-10-CM

## 2023-03-01 RX ORDER — DEXTROAMPHETAMINE SACCHARATE, AMPHETAMINE ASPARTATE MONOHYDRATE, DEXTROAMPHETAMINE SULFATE AND AMPHETAMINE SULFATE 5; 5; 5; 5 MG/1; MG/1; MG/1; MG/1
20 CAPSULE, EXTENDED RELEASE ORAL EVERY MORNING
Qty: 30 CAPSULE | Refills: 0 | Status: SHIPPED | OUTPATIENT
Start: 2023-03-01 | End: 2023-03-31

## 2023-03-01 NOTE — TELEPHONE ENCOUNTER
Patient is requesting medication refill. Please approve or deny this request.    Rx requested:  Requested Prescriptions     Pending Prescriptions Disp Refills    amphetamine-dextroamphetamine (ADDERALL XR) 20 MG extended release capsule 30 capsule 0     Sig: Take 1 capsule by mouth every morning for 30 days.          Last Office Visit:   6/1/2022      Next Visit Date:  Future Appointments   Date Time Provider Nikkie Jalloh   7/5/2023  3:45 PM MD Maurilio Alvarez Neurology -

## 2023-03-06 ENCOUNTER — TELEPHONE (OUTPATIENT)
Dept: NEUROLOGY | Age: 33
End: 2023-03-06

## 2023-03-06 NOTE — TELEPHONE ENCOUNTER
Patient has been adderall  xr 20mg for almost a year and recently she states that it is wearing off in the afternoon.  Can her dose be increased or something added since the adderall has helped her so much     Please advise       [phone]

## 2023-03-07 DIAGNOSIS — F90.0 ATTENTION DEFICIT HYPERACTIVITY DISORDER (ADHD), PREDOMINANTLY INATTENTIVE TYPE: Primary | ICD-10-CM

## 2023-03-08 RX ORDER — DEXTROAMPHETAMINE SACCHARATE, AMPHETAMINE ASPARTATE, DEXTROAMPHETAMINE SULFATE AND AMPHETAMINE SULFATE 2.5; 2.5; 2.5; 2.5 MG/1; MG/1; MG/1; MG/1
10 TABLET ORAL DAILY
Qty: 30 TABLET | Refills: 0 | Status: SHIPPED | OUTPATIENT
Start: 2023-03-08 | End: 2023-04-07

## 2023-04-12 DIAGNOSIS — F90.0 ATTENTION DEFICIT HYPERACTIVITY DISORDER (ADHD), PREDOMINANTLY INATTENTIVE TYPE: ICD-10-CM

## 2023-04-12 RX ORDER — DEXTROAMPHETAMINE SACCHARATE, AMPHETAMINE ASPARTATE MONOHYDRATE, DEXTROAMPHETAMINE SULFATE AND AMPHETAMINE SULFATE 5; 5; 5; 5 MG/1; MG/1; MG/1; MG/1
20 CAPSULE, EXTENDED RELEASE ORAL EVERY MORNING
Qty: 30 CAPSULE | Refills: 0 | Status: SHIPPED | OUTPATIENT
Start: 2023-04-12 | End: 2023-05-12

## 2023-04-12 RX ORDER — DEXTROAMPHETAMINE SACCHARATE, AMPHETAMINE ASPARTATE, DEXTROAMPHETAMINE SULFATE AND AMPHETAMINE SULFATE 2.5; 2.5; 2.5; 2.5 MG/1; MG/1; MG/1; MG/1
10 TABLET ORAL DAILY
Qty: 30 TABLET | Refills: 0 | Status: SHIPPED | OUTPATIENT
Start: 2023-04-12 | End: 2023-05-12

## 2023-05-18 DIAGNOSIS — F90.0 ATTENTION DEFICIT HYPERACTIVITY DISORDER (ADHD), PREDOMINANTLY INATTENTIVE TYPE: ICD-10-CM

## 2023-05-18 RX ORDER — DEXTROAMPHETAMINE SACCHARATE, AMPHETAMINE ASPARTATE MONOHYDRATE, DEXTROAMPHETAMINE SULFATE AND AMPHETAMINE SULFATE 5; 5; 5; 5 MG/1; MG/1; MG/1; MG/1
20 CAPSULE, EXTENDED RELEASE ORAL EVERY MORNING
Qty: 30 CAPSULE | Refills: 0 | Status: SHIPPED | OUTPATIENT
Start: 2023-05-18 | End: 2023-06-22 | Stop reason: SDUPTHER

## 2023-05-18 RX ORDER — DEXTROAMPHETAMINE SACCHARATE, AMPHETAMINE ASPARTATE, DEXTROAMPHETAMINE SULFATE AND AMPHETAMINE SULFATE 2.5; 2.5; 2.5; 2.5 MG/1; MG/1; MG/1; MG/1
10 TABLET ORAL DAILY
Qty: 30 TABLET | Refills: 0 | Status: SHIPPED | OUTPATIENT
Start: 2023-05-18 | End: 2023-06-22 | Stop reason: SDUPTHER

## 2023-06-21 DIAGNOSIS — F90.0 ATTENTION DEFICIT HYPERACTIVITY DISORDER (ADHD), PREDOMINANTLY INATTENTIVE TYPE: ICD-10-CM

## 2023-06-22 RX ORDER — DEXTROAMPHETAMINE SACCHARATE, AMPHETAMINE ASPARTATE, DEXTROAMPHETAMINE SULFATE AND AMPHETAMINE SULFATE 2.5; 2.5; 2.5; 2.5 MG/1; MG/1; MG/1; MG/1
10 TABLET ORAL DAILY
Qty: 30 TABLET | Refills: 0 | Status: SHIPPED | OUTPATIENT
Start: 2023-06-22 | End: 2023-07-05 | Stop reason: SDUPTHER

## 2023-06-22 RX ORDER — DEXTROAMPHETAMINE SACCHARATE, AMPHETAMINE ASPARTATE MONOHYDRATE, DEXTROAMPHETAMINE SULFATE AND AMPHETAMINE SULFATE 5; 5; 5; 5 MG/1; MG/1; MG/1; MG/1
20 CAPSULE, EXTENDED RELEASE ORAL EVERY MORNING
Qty: 30 CAPSULE | Refills: 0 | Status: SHIPPED | OUTPATIENT
Start: 2023-06-22 | End: 2023-07-22

## 2023-07-05 ENCOUNTER — OFFICE VISIT (OUTPATIENT)
Dept: NEUROLOGY | Age: 33
End: 2023-07-05

## 2023-07-05 VITALS
WEIGHT: 210 LBS | BODY MASS INDEX: 32.89 KG/M2 | DIASTOLIC BLOOD PRESSURE: 70 MMHG | SYSTOLIC BLOOD PRESSURE: 106 MMHG | HEART RATE: 64 BPM

## 2023-07-05 DIAGNOSIS — F90.0 ATTENTION DEFICIT HYPERACTIVITY DISORDER (ADHD), PREDOMINANTLY INATTENTIVE TYPE: ICD-10-CM

## 2023-07-05 DIAGNOSIS — R56.9 SEIZURE (HCC): ICD-10-CM

## 2023-07-05 DIAGNOSIS — Z79.899 ENCOUNTER FOR LONG-TERM (CURRENT) USE OF HIGH-RISK MEDICATION: Primary | ICD-10-CM

## 2023-07-05 DIAGNOSIS — R51.9 FREQUENT HEADACHES: ICD-10-CM

## 2023-07-05 RX ORDER — DEXTROAMPHETAMINE SACCHARATE, AMPHETAMINE ASPARTATE, DEXTROAMPHETAMINE SULFATE AND AMPHETAMINE SULFATE 2.5; 2.5; 2.5; 2.5 MG/1; MG/1; MG/1; MG/1
TABLET ORAL
Qty: 60 TABLET | Refills: 0 | Status: SHIPPED | OUTPATIENT
Start: 2023-07-05 | End: 2023-08-05

## 2023-07-05 ASSESSMENT — ENCOUNTER SYMPTOMS
COLOR CHANGE: 0
TROUBLE SWALLOWING: 0
NAUSEA: 0
SHORTNESS OF BREATH: 0
PHOTOPHOBIA: 0
CHOKING: 0
BACK PAIN: 0
VOMITING: 0

## 2023-07-05 NOTE — PROGRESS NOTES
Subjective:      Patient ID: Noni Gottlieb is a 33 y.o. female who presents today for:  Chief Complaint   Patient presents with    Follow-up     Pt is still having issues with her afternoon fatigue and crash. Pt would like to discuss an afternoon increase she is on XR 20 in am, 10 in the afternoon. She was on XR 10 in the afternoon but the shortage made it hard. Pt is concerned about her focus because she is using a saw at work now and has to be efficient. Yazmin Barnes is working well for pt, no recent seizures. Other     Pt last dose of adderall was this morning. HPI right-handed female with history of attention deficit disorder. Patient is seen here also for seizures she has not any seizures for quite some time on Keppra 1000 mg twice a day and tolerating this without any mood changes. Patient is still having afternoon fatigue and she is on Adderall 10 mg in the afternoon. Primary of the short as it made it somewhat difficult does not report any other issues at all. She denies any hyperactivity with this. Her anxiety is well controlled.     Past Medical History:   Diagnosis Date    Seizures ([address])      Past Surgical History:   Procedure Laterality Date    TONSILLECTOMY  1998     Social History     Socioeconomic History    Marital status: Single     Spouse name: Not on file    Number of children: Not on file    Years of education: Not on file    Highest education level: Not on file   Occupational History    Not on file   Tobacco Use    Smoking status: Never    Smokeless tobacco: Never   Substance and Sexual Activity    Alcohol use: No    Drug use: No    Sexual activity: Not on file   Other Topics Concern    Not on file   Social History Narrative    Not on file     Social Determinants of Health     Financial Resource Strain: Not on file   Food Insecurity: Not on file   Transportation Needs: Not on file   Physical Activity: Not on file   Stress: Not on file   Social Connections: Not on file   Intimate
ecchymosis to face

## 2023-07-24 DIAGNOSIS — F98.8 ATTENTION DEFICIT DISORDER, UNSPECIFIED HYPERACTIVITY PRESENCE: Primary | ICD-10-CM

## 2023-07-24 RX ORDER — DEXTROAMPHETAMINE SACCHARATE, AMPHETAMINE ASPARTATE, DEXTROAMPHETAMINE SULFATE AND AMPHETAMINE SULFATE 5; 5; 5; 5 MG/1; MG/1; MG/1; MG/1
10 TABLET ORAL DAILY
Qty: 15 TABLET | Refills: 0 | Status: SHIPPED | OUTPATIENT
Start: 2023-07-24 | End: 2023-08-23

## 2023-07-24 NOTE — TELEPHONE ENCOUNTER
Pharmacy is requesting medication refill. Please approve or deny this request.    Rx requested:  Requested Prescriptions     Pending Prescriptions Disp Refills    amphetamine-dextroamphetamine (ADDERALL, 20MG,) 20 MG tablet 15 tablet 0     Sig: Take 0.5 tablets by mouth daily for 30 days. Max Daily Amount: 10 mg         Last Office Visit:   8/12/2021      Next Visit Date:  Future Appointments   Date Time Provider [address] [place] [address]   1/10/2024  2:15 PM MD Jeannette Bond [facility] -     Pharmacy sent fax stating that they are out of 10 mg but they have 20 mg in stock.

## 2023-07-25 DIAGNOSIS — F90.0 ATTENTION DEFICIT HYPERACTIVITY DISORDER (ADHD), PREDOMINANTLY INATTENTIVE TYPE: ICD-10-CM

## 2023-07-25 NOTE — TELEPHONE ENCOUNTER
Patient is requesting medication refill. Please approve or deny this request.    Rx requested:  Requested Prescriptions     Pending Prescriptions Disp Refills    amphetamine-dextroamphetamine (ADDERALL XR) 20 MG extended release capsule 30 capsule 0     Sig: Take 1 capsule by mouth every morning for 30 days.          Last Office Visit:   7/5/2023      Next Visit Date:  Future Appointments   Date Time Provider [address] [place] [address]   1/10/2024  2:15 PM MD Helio Mcghee [facility] -

## 2023-07-27 RX ORDER — DEXTROAMPHETAMINE SACCHARATE, AMPHETAMINE ASPARTATE MONOHYDRATE, DEXTROAMPHETAMINE SULFATE AND AMPHETAMINE SULFATE 5; 5; 5; 5 MG/1; MG/1; MG/1; MG/1
20 CAPSULE, EXTENDED RELEASE ORAL EVERY MORNING
Qty: 30 CAPSULE | Refills: 0 | Status: SHIPPED | OUTPATIENT
Start: 2023-07-27 | End: 2023-08-26

## 2023-08-22 DIAGNOSIS — F98.8 ATTENTION DEFICIT DISORDER, UNSPECIFIED HYPERACTIVITY PRESENCE: ICD-10-CM

## 2023-08-22 DIAGNOSIS — F90.0 ATTENTION DEFICIT HYPERACTIVITY DISORDER (ADHD), PREDOMINANTLY INATTENTIVE TYPE: ICD-10-CM

## 2023-08-22 RX ORDER — DEXTROAMPHETAMINE SACCHARATE, AMPHETAMINE ASPARTATE MONOHYDRATE, DEXTROAMPHETAMINE SULFATE AND AMPHETAMINE SULFATE 5; 5; 5; 5 MG/1; MG/1; MG/1; MG/1
20 CAPSULE, EXTENDED RELEASE ORAL EVERY MORNING
Qty: 30 CAPSULE | Refills: 0 | Status: SHIPPED | OUTPATIENT
Start: 2023-08-22 | End: 2023-09-21

## 2023-08-22 RX ORDER — DEXTROAMPHETAMINE SACCHARATE, AMPHETAMINE ASPARTATE, DEXTROAMPHETAMINE SULFATE AND AMPHETAMINE SULFATE 5; 5; 5; 5 MG/1; MG/1; MG/1; MG/1
10 TABLET ORAL DAILY
Qty: 15 TABLET | Refills: 0 | Status: SHIPPED | OUTPATIENT
Start: 2023-08-22 | End: 2023-09-21

## 2023-09-25 DIAGNOSIS — F90.0 ATTENTION DEFICIT HYPERACTIVITY DISORDER (ADHD), PREDOMINANTLY INATTENTIVE TYPE: ICD-10-CM

## 2023-09-25 RX ORDER — DEXTROAMPHETAMINE SACCHARATE, AMPHETAMINE ASPARTATE, DEXTROAMPHETAMINE SULFATE AND AMPHETAMINE SULFATE 2.5; 2.5; 2.5; 2.5 MG/1; MG/1; MG/1; MG/1
TABLET ORAL
Qty: 60 TABLET | Refills: 0 | Status: SHIPPED | OUTPATIENT
Start: 2023-09-25 | End: 2023-10-25

## 2023-09-25 RX ORDER — DEXTROAMPHETAMINE SACCHARATE, AMPHETAMINE ASPARTATE MONOHYDRATE, DEXTROAMPHETAMINE SULFATE AND AMPHETAMINE SULFATE 5; 5; 5; 5 MG/1; MG/1; MG/1; MG/1
20 CAPSULE, EXTENDED RELEASE ORAL EVERY MORNING
Qty: 30 CAPSULE | Refills: 0 | Status: SHIPPED | OUTPATIENT
Start: 2023-09-25 | End: 2023-10-25

## 2023-09-25 NOTE — TELEPHONE ENCOUNTER
requesting medication refill. Please approve or deny this request.    Rx requested:  Requested Prescriptions     Pending Prescriptions Disp Refills    amphetamine-dextroamphetamine (ADDERALL XR) 20 MG extended release capsule 30 capsule 0     Sig: Take 1 capsule by mouth every morning for 30 days. amphetamine-dextroamphetamine (ADDERALL, 10MG,) 10 MG tablet 60 tablet 0     Si-2 tablets daily in the afternoon as needed.  No more then 2 tablets in a day         Last Office Visit:   2023      Next Visit Date:  Future Appointments   Date Time Provider [address] [place] [address]   1/10/2024  2:15 PM MD Vanna New [facility] -

## 2023-09-27 DIAGNOSIS — R56.9 CONVULSIONS, UNSPECIFIED CONVULSION TYPE (HCC): ICD-10-CM

## 2023-09-27 RX ORDER — LEVETIRACETAM 500 MG/1
1000 TABLET ORAL 2 TIMES DAILY
Qty: 120 TABLET | Refills: 4 | Status: SHIPPED | OUTPATIENT
Start: 2023-09-27 | End: 2024-02-24

## 2023-11-09 DIAGNOSIS — F90.0 ATTENTION DEFICIT HYPERACTIVITY DISORDER (ADHD), PREDOMINANTLY INATTENTIVE TYPE: ICD-10-CM

## 2023-11-10 RX ORDER — DEXTROAMPHETAMINE SACCHARATE, AMPHETAMINE ASPARTATE, DEXTROAMPHETAMINE SULFATE AND AMPHETAMINE SULFATE 2.5; 2.5; 2.5; 2.5 MG/1; MG/1; MG/1; MG/1
TABLET ORAL
Qty: 60 TABLET | Refills: 0 | Status: SHIPPED | OUTPATIENT
Start: 2023-11-10 | End: 2023-12-09

## 2023-11-10 RX ORDER — DEXTROAMPHETAMINE SACCHARATE, AMPHETAMINE ASPARTATE MONOHYDRATE, DEXTROAMPHETAMINE SULFATE AND AMPHETAMINE SULFATE 5; 5; 5; 5 MG/1; MG/1; MG/1; MG/1
20 CAPSULE, EXTENDED RELEASE ORAL EVERY MORNING
Qty: 30 CAPSULE | Refills: 0 | Status: SHIPPED | OUTPATIENT
Start: 2023-11-10 | End: 2023-12-10

## 2023-12-20 DIAGNOSIS — F90.0 ATTENTION DEFICIT HYPERACTIVITY DISORDER (ADHD), PREDOMINANTLY INATTENTIVE TYPE: ICD-10-CM

## 2023-12-20 NOTE — TELEPHONE ENCOUNTER
Patient is requesting medication refill. Please approve or deny this request.    Rx requested:  Requested Prescriptions     Pending Prescriptions Disp Refills    amphetamine-dextroamphetamine (ADDERALL, 10MG,) 10 MG tablet 60 tablet 0     Si-2 tablets daily in the afternoon as needed. No more then 2 tablets in a day    amphetamine-dextroamphetamine (ADDERALL XR) 20 MG extended release capsule 30 capsule 0     Sig: Take 1 capsule by mouth every morning for 30 days.         Last Office Visit:   2023      Next Visit Date:  Future Appointments   Date Time Provider Department Center   1/10/2024  2:15 PM Hari Avendaño MD [place] [facility] -

## 2023-12-21 RX ORDER — DEXTROAMPHETAMINE SACCHARATE, AMPHETAMINE ASPARTATE, DEXTROAMPHETAMINE SULFATE AND AMPHETAMINE SULFATE 2.5; 2.5; 2.5; 2.5 MG/1; MG/1; MG/1; MG/1
TABLET ORAL
Qty: 60 TABLET | Refills: 0 | Status: SHIPPED | OUTPATIENT
Start: 2023-12-21 | End: 2024-01-18

## 2023-12-21 RX ORDER — DEXTROAMPHETAMINE SACCHARATE, AMPHETAMINE ASPARTATE MONOHYDRATE, DEXTROAMPHETAMINE SULFATE AND AMPHETAMINE SULFATE 5; 5; 5; 5 MG/1; MG/1; MG/1; MG/1
20 CAPSULE, EXTENDED RELEASE ORAL EVERY MORNING
Qty: 30 CAPSULE | Refills: 0 | Status: SHIPPED | OUTPATIENT
Start: 2023-12-21 | End: 2024-01-20

## 2024-01-22 DIAGNOSIS — R56.9 CONVULSIONS, UNSPECIFIED CONVULSION TYPE (HCC): ICD-10-CM

## 2024-01-22 DIAGNOSIS — F90.0 ATTENTION DEFICIT HYPERACTIVITY DISORDER (ADHD), PREDOMINANTLY INATTENTIVE TYPE: ICD-10-CM

## 2024-01-22 RX ORDER — DEXTROAMPHETAMINE SACCHARATE, AMPHETAMINE ASPARTATE, DEXTROAMPHETAMINE SULFATE AND AMPHETAMINE SULFATE 2.5; 2.5; 2.5; 2.5 MG/1; MG/1; MG/1; MG/1
TABLET ORAL
Qty: 60 TABLET | Refills: 0 | Status: SHIPPED | OUTPATIENT
Start: 2024-01-22 | End: 2024-03-13 | Stop reason: SDUPTHER

## 2024-01-22 RX ORDER — DEXTROAMPHETAMINE SACCHARATE, AMPHETAMINE ASPARTATE MONOHYDRATE, DEXTROAMPHETAMINE SULFATE AND AMPHETAMINE SULFATE 5; 5; 5; 5 MG/1; MG/1; MG/1; MG/1
20 CAPSULE, EXTENDED RELEASE ORAL EVERY MORNING
Qty: 30 CAPSULE | Refills: 0 | Status: SHIPPED | OUTPATIENT
Start: 2024-01-22 | End: 2024-03-13 | Stop reason: SDUPTHER

## 2024-01-22 RX ORDER — LEVETIRACETAM 500 MG/1
1000 TABLET ORAL 2 TIMES DAILY
Qty: 120 TABLET | Refills: 4 | Status: SHIPPED | OUTPATIENT
Start: 2024-01-22 | End: 2024-06-20

## 2024-01-22 NOTE — TELEPHONE ENCOUNTER
Patient is  requesting medication refill. Please approve or deny this request.    Rx requested:  Requested Prescriptions     Pending Prescriptions Disp Refills    amphetamine-dextroamphetamine (ADDERALL XR) 20 MG extended release capsule 30 capsule 0     Sig: Take 1 capsule by mouth every morning for 30 days.    amphetamine-dextroamphetamine (ADDERALL, 10MG,) 10 MG tablet 60 tablet 0     Si-2 tablets daily in the afternoon as needed. No more then 2 tablets in a day    levETIRAcetam (KEPPRA) 500 MG tablet 120 tablet 4     Sig: Take 2 tablets by mouth 2 times daily         Last Office Visit:   2023      Next Visit Date:  Future Appointments   Date Time Provider Department Center   2024  3:45 PM Hari Avendaño MD [place] [facility] -

## 2024-03-13 DIAGNOSIS — F90.0 ATTENTION DEFICIT HYPERACTIVITY DISORDER (ADHD), PREDOMINANTLY INATTENTIVE TYPE: ICD-10-CM

## 2024-03-13 NOTE — TELEPHONE ENCOUNTER
Patient is requesting medication refill. Please approve or deny this request.    Rx requested:  Requested Prescriptions     Pending Prescriptions Disp Refills    amphetamine-dextroamphetamine (ADDERALL XR) 20 MG extended release capsule 30 capsule 0     Sig: Take 1 capsule by mouth every morning for 30 days.    amphetamine-dextroamphetamine (ADDERALL, 10MG,) 10 MG tablet 60 tablet 0     Si-2 tablets daily in the afternoon as needed. No more then 2 tablets in a day         Last Office Visit:   2023      Next Visit Date:  Future Appointments   Date Time Provider Department Center   2024  3:45 PM Hari Avendaño MD [place] [facility] -

## 2024-03-14 RX ORDER — DEXTROAMPHETAMINE SACCHARATE, AMPHETAMINE ASPARTATE MONOHYDRATE, DEXTROAMPHETAMINE SULFATE AND AMPHETAMINE SULFATE 5; 5; 5; 5 MG/1; MG/1; MG/1; MG/1
20 CAPSULE, EXTENDED RELEASE ORAL EVERY MORNING
Qty: 30 CAPSULE | Refills: 0 | Status: SHIPPED | OUTPATIENT
Start: 2024-03-14 | End: 2024-04-13

## 2024-03-14 RX ORDER — DEXTROAMPHETAMINE SACCHARATE, AMPHETAMINE ASPARTATE, DEXTROAMPHETAMINE SULFATE AND AMPHETAMINE SULFATE 2.5; 2.5; 2.5; 2.5 MG/1; MG/1; MG/1; MG/1
TABLET ORAL
Qty: 60 TABLET | Refills: 0 | Status: SHIPPED | OUTPATIENT
Start: 2024-03-14 | End: 2024-04-10

## 2024-03-20 PROBLEM — G40.909 EPILEPSY (MULTI): Status: ACTIVE | Noted: 2024-03-20

## 2024-04-10 ENCOUNTER — HOSPITAL ENCOUNTER (EMERGENCY)
Age: 34
Discharge: HOME | End: 2024-04-10
Payer: COMMERCIAL

## 2024-04-10 VITALS
SYSTOLIC BLOOD PRESSURE: 138 MMHG | HEART RATE: 76 BPM | OXYGEN SATURATION: 100 % | DIASTOLIC BLOOD PRESSURE: 87 MMHG | BODY MASS INDEX: 33.91 KG/M2 | RESPIRATION RATE: 16 BRPM | TEMPERATURE: 97.9 F | HEIGHT: 66 IN | WEIGHT: 211 LBS

## 2024-04-10 DIAGNOSIS — T78.40XA ALLERGY, INITIAL ENCOUNTER: Primary | ICD-10-CM

## 2024-04-10 PROCEDURE — 6360000002 HC RX W HCPCS: Performed by: NURSE PRACTITIONER

## 2024-04-10 PROCEDURE — 99284 EMERGENCY DEPT VISIT MOD MDM: CPT

## 2024-04-10 PROCEDURE — 96372 THER/PROPH/DIAG INJ SC/IM: CPT

## 2024-04-10 PROCEDURE — 6370000000 HC RX 637 (ALT 250 FOR IP): Performed by: NURSE PRACTITIONER

## 2024-04-10 RX ORDER — DIPHENHYDRAMINE HCL 25 MG
25 CAPSULE ORAL EVERY 6 HOURS PRN
Qty: 1 CAPSULE | Refills: 0 | Status: SHIPPED | OUTPATIENT
Start: 2024-04-10 | End: 2024-04-20

## 2024-04-10 RX ORDER — FAMOTIDINE 20 MG/1
20 TABLET, FILM COATED ORAL ONCE
Status: COMPLETED | OUTPATIENT
Start: 2024-04-10 | End: 2024-04-10

## 2024-04-10 RX ORDER — DIPHENHYDRAMINE HCL 25 MG
25 CAPSULE ORAL ONCE
Status: COMPLETED | OUTPATIENT
Start: 2024-04-10 | End: 2024-04-10

## 2024-04-10 RX ORDER — FAMOTIDINE 20 MG/1
20 TABLET, FILM COATED ORAL 2 TIMES DAILY
Qty: 20 TABLET | Refills: 0 | Status: SHIPPED | OUTPATIENT
Start: 2024-04-10 | End: 2024-04-20

## 2024-04-10 RX ORDER — TRIAMCINOLONE ACETONIDE 40 MG/ML
80 INJECTION, SUSPENSION INTRA-ARTICULAR; INTRAMUSCULAR ONCE
Status: COMPLETED | OUTPATIENT
Start: 2024-04-10 | End: 2024-04-10

## 2024-04-10 RX ADMIN — TRIAMCINOLONE ACETONIDE 80 MG: 40 INJECTION, SUSPENSION INTRA-ARTICULAR; INTRAMUSCULAR at 15:20

## 2024-04-10 RX ADMIN — DIPHENHYDRAMINE HYDROCHLORIDE 25 MG: 25 CAPSULE ORAL at 15:18

## 2024-04-10 RX ADMIN — FAMOTIDINE 20 MG: 20 TABLET, FILM COATED ORAL at 15:18

## 2024-04-10 ASSESSMENT — ENCOUNTER SYMPTOMS
RHINORRHEA: 0
EYE PAIN: 0
SHORTNESS OF BREATH: 0
DIARRHEA: 0
ABDOMINAL PAIN: 0
CHOKING: 0
EYE REDNESS: 0
ABDOMINAL DISTENTION: 0
EYE DISCHARGE: 0
BLOOD IN STOOL: 0
NAUSEA: 0
CHEST TIGHTNESS: 0
CONSTIPATION: 0
STRIDOR: 0
SINUS PRESSURE: 0
WHEEZING: 0
ALLERGIC/IMMUNOLOGIC NEGATIVE: 1
APNEA: 0
EYE ITCHING: 0
COLOR CHANGE: 0
TROUBLE SWALLOWING: 0
SORE THROAT: 0
VOMITING: 0
PHOTOPHOBIA: 0
COUGH: 0
BACK PAIN: 0
SINUS PAIN: 0
VOICE CHANGE: 0
FACIAL SWELLING: 0

## 2024-04-10 ASSESSMENT — LIFESTYLE VARIABLES
HOW MANY STANDARD DRINKS CONTAINING ALCOHOL DO YOU HAVE ON A TYPICAL DAY: PATIENT DOES NOT DRINK
HOW OFTEN DO YOU HAVE A DRINK CONTAINING ALCOHOL: NEVER

## 2024-04-10 ASSESSMENT — PAIN - FUNCTIONAL ASSESSMENT: PAIN_FUNCTIONAL_ASSESSMENT: NONE - DENIES PAIN

## 2024-04-10 NOTE — ED PROVIDER NOTES
Hydrocodone-acetaminophen    FAMILY HISTORY       Family History   Problem Relation Age of Onset    Diabetes Mother     High Blood Pressure Mother     Kidney Disease Mother     Seizures Father           SOCIAL HISTORY       Social History     Socioeconomic History    Marital status: Single   Tobacco Use    Smoking status: Never    Smokeless tobacco: Never   Substance and Sexual Activity    Alcohol use: No    Drug use: No       SCREENINGS         [place] Coma Scale  Eye Opening: Spontaneous  Best Verbal Response: Oriented  Best Motor Response: Obeys commands  Stas Coma Scale Score: 15                     CIWA Assessment  BP: 138/87  Pulse: 76                 PHYSICAL EXAM    (up to 7 for level 4, 8 or more for level 5)     ED Triage Vitals [04/10/24 1458]   BP Temp Temp Source Pulse Respirations SpO2 Height Weight - Scale   (!) 166/96 97.9 °F (36.6 °C) Oral 99 16 99 % 1.676 m (5' 6\") 95.7 kg (211 lb)       Physical Exam  Vitals and nursing note reviewed.   Constitutional:       General: She is not in acute distress.     Appearance: Normal appearance. She is normal weight. She is not ill-appearing, toxic-appearing or diaphoretic.   HENT:      Head: Normocephalic and atraumatic.      Right Ear: Tympanic membrane, ear canal and external ear normal.      Left Ear: Tympanic membrane, ear canal and external ear normal.      Nose: Nose normal. No congestion or rhinorrhea.      Mouth/Throat:      Mouth: Mucous membranes are moist.      Pharynx: Oropharynx is clear. No oropharyngeal exudate or posterior oropharyngeal erythema.   Eyes:      Extraocular Movements: Extraocular movements intact.      Conjunctiva/sclera: Conjunctivae normal.      Pupils: Pupils are equal, round, and reactive to light.   Cardiovascular:      Rate and Rhythm: Normal rate and regular rhythm.      Pulses: Normal pulses.      Heart sounds: Normal heart sounds.   Pulmonary:      Effort: Pulmonary effort is normal. No respiratory distress.      Breath

## 2024-04-10 NOTE — ED TRIAGE NOTES
Pt a/o x 3 skin pink w/d resp non labored. Lungs clear. Pt denies sob or tongue swelling or dysphagia. Pt reports pruritus and rash with hives on chest arms and face started at 1400. Pt took a zyrtec and now hives on arms and chest are gone. Pt has mild redness to bilat arms and facial cheeks.

## 2024-04-24 DIAGNOSIS — F98.8 ATTENTION DEFICIT DISORDER, UNSPECIFIED HYPERACTIVITY PRESENCE: ICD-10-CM

## 2024-04-24 DIAGNOSIS — F90.0 ATTENTION DEFICIT HYPERACTIVITY DISORDER (ADHD), PREDOMINANTLY INATTENTIVE TYPE: ICD-10-CM

## 2024-04-24 DIAGNOSIS — R56.9 CONVULSIONS, UNSPECIFIED CONVULSION TYPE (HCC): ICD-10-CM

## 2024-04-25 DIAGNOSIS — F90.0 ATTENTION DEFICIT HYPERACTIVITY DISORDER (ADHD), PREDOMINANTLY INATTENTIVE TYPE: ICD-10-CM

## 2024-04-25 RX ORDER — LEVETIRACETAM 500 MG/1
1000 TABLET ORAL 2 TIMES DAILY
Qty: 120 TABLET | Refills: 4 | Status: SHIPPED | OUTPATIENT
Start: 2024-04-25 | End: 2024-09-22

## 2024-04-25 RX ORDER — DEXTROAMPHETAMINE SACCHARATE, AMPHETAMINE ASPARTATE, DEXTROAMPHETAMINE SULFATE AND AMPHETAMINE SULFATE 5; 5; 5; 5 MG/1; MG/1; MG/1; MG/1
10 TABLET ORAL DAILY
Qty: 15 TABLET | Refills: 0 | Status: SHIPPED | OUTPATIENT
Start: 2024-04-25 | End: 2024-05-25

## 2024-04-25 RX ORDER — DEXTROAMPHETAMINE SACCHARATE, AMPHETAMINE ASPARTATE MONOHYDRATE, DEXTROAMPHETAMINE SULFATE AND AMPHETAMINE SULFATE 5; 5; 5; 5 MG/1; MG/1; MG/1; MG/1
20 CAPSULE, EXTENDED RELEASE ORAL EVERY MORNING
Qty: 30 CAPSULE | Refills: 0 | Status: SHIPPED | OUTPATIENT
Start: 2024-04-25 | End: 2024-05-31 | Stop reason: SDUPTHER

## 2024-04-25 RX ORDER — DEXTROAMPHETAMINE SACCHARATE, AMPHETAMINE ASPARTATE, DEXTROAMPHETAMINE SULFATE AND AMPHETAMINE SULFATE 2.5; 2.5; 2.5; 2.5 MG/1; MG/1; MG/1; MG/1
TABLET ORAL
Qty: 60 TABLET | Refills: 0 | Status: SHIPPED | OUTPATIENT
Start: 2024-04-25 | End: 2024-05-31 | Stop reason: SDUPTHER

## 2024-04-25 NOTE — TELEPHONE ENCOUNTER
Patient is requesting medication refill. Please approve or deny this request.    Rx requested:  Requested Prescriptions     Pending Prescriptions Disp Refills    amphetamine-dextroamphetamine (ADDERALL, 20MG,) 20 MG tablet 15 tablet 0     Sig: Take 0.5 tablets by mouth daily for 30 days. Max Daily Amount: 10 mg    levETIRAcetam (KEPPRA) 500 MG tablet 120 tablet 4     Sig: Take 2 tablets by mouth 2 times daily    amphetamine-dextroamphetamine (ADDERALL, 10MG,) 10 MG tablet 60 tablet 0     Si-2 tablets daily in the afternoon as needed. No more then 2 tablets in a day         Last Office Visit:   2023      Next Visit Date:  Future Appointments   Date Time Provider Department Center   2024  3:45 PM Hari Avendaño MD [facility] Neurology -     Pt had no show appt 01/10/2024

## 2024-04-25 NOTE — TELEPHONE ENCOUNTER
Patient is requesting medication refill. Please approve or deny this request.    Rx requested:  Requested Prescriptions     Pending Prescriptions Disp Refills    amphetamine-dextroamphetamine (ADDERALL XR) 20 MG extended release capsule 30 capsule 0     Sig: Take 1 capsule by mouth every morning for 30 days.         Last Office Visit:   7/5/2023      Next Visit Date:  Future Appointments   Date Time Provider Department Center   5/22/2024  3:45 PM Hari Avendaño MD [place] NEURO Neurology -     Spoke with jose at pharmacy and adderall 20mg tablets was canceled so that we can send the capsules due to her requesting the wrong one.

## 2024-05-23 ENCOUNTER — TELEPHONE (OUTPATIENT)
Dept: NEUROLOGY | Age: 34
End: 2024-05-23

## 2024-05-23 NOTE — TELEPHONE ENCOUNTER
Pt is requesting letter that states she has a Hx of seizures/epilepsy.  She states that she has not had any recently but would just like the letter incase she needs to give it to employer.

## 2024-05-31 DIAGNOSIS — F90.0 ATTENTION DEFICIT HYPERACTIVITY DISORDER (ADHD), PREDOMINANTLY INATTENTIVE TYPE: ICD-10-CM

## 2024-05-31 RX ORDER — DEXTROAMPHETAMINE SACCHARATE, AMPHETAMINE ASPARTATE MONOHYDRATE, DEXTROAMPHETAMINE SULFATE AND AMPHETAMINE SULFATE 5; 5; 5; 5 MG/1; MG/1; MG/1; MG/1
20 CAPSULE, EXTENDED RELEASE ORAL EVERY MORNING
Qty: 30 CAPSULE | Refills: 0 | Status: SHIPPED | OUTPATIENT
Start: 2024-05-31 | End: 2024-07-05 | Stop reason: SDUPTHER

## 2024-05-31 RX ORDER — DEXTROAMPHETAMINE SACCHARATE, AMPHETAMINE ASPARTATE, DEXTROAMPHETAMINE SULFATE AND AMPHETAMINE SULFATE 2.5; 2.5; 2.5; 2.5 MG/1; MG/1; MG/1; MG/1
TABLET ORAL
Qty: 60 TABLET | Refills: 0 | Status: SHIPPED | OUTPATIENT
Start: 2024-05-31 | End: 2024-07-05 | Stop reason: SDUPTHER

## 2024-05-31 NOTE — TELEPHONE ENCOUNTER
Patient is requesting medication refill. Please approve or deny this request.    Rx requested:  Requested Prescriptions     Pending Prescriptions Disp Refills    amphetamine-dextroamphetamine (ADDERALL, 10MG,) 10 MG tablet 60 tablet 0     Si-2 tablets daily in the afternoon as needed. No more then 2 tablets in a day    amphetamine-dextroamphetamine (ADDERALL XR) 20 MG extended release capsule 30 capsule 0     Sig: Take 1 capsule by mouth every morning for 30 days.         Last Office Visit:   2023      Next Visit Date:  Future Appointments   Date Time Provider Department Center   9/3/2024  3:00 PM Hari Avendaño MD [facility] NEUR Neurology -     Last request was 2024- 2024

## 2024-07-05 DIAGNOSIS — F90.0 ATTENTION DEFICIT HYPERACTIVITY DISORDER (ADHD), PREDOMINANTLY INATTENTIVE TYPE: ICD-10-CM

## 2024-07-05 RX ORDER — DEXTROAMPHETAMINE SACCHARATE, AMPHETAMINE ASPARTATE, DEXTROAMPHETAMINE SULFATE AND AMPHETAMINE SULFATE 2.5; 2.5; 2.5; 2.5 MG/1; MG/1; MG/1; MG/1
TABLET ORAL
Qty: 60 TABLET | Refills: 0 | Status: SHIPPED | OUTPATIENT
Start: 2024-07-05 | End: 2024-07-24 | Stop reason: SDUPTHER

## 2024-07-05 RX ORDER — DEXTROAMPHETAMINE SACCHARATE, AMPHETAMINE ASPARTATE MONOHYDRATE, DEXTROAMPHETAMINE SULFATE AND AMPHETAMINE SULFATE 5; 5; 5; 5 MG/1; MG/1; MG/1; MG/1
20 CAPSULE, EXTENDED RELEASE ORAL EVERY MORNING
Qty: 30 CAPSULE | Refills: 0 | Status: SHIPPED | OUTPATIENT
Start: 2024-07-05 | End: 2024-07-24 | Stop reason: SDUPTHER

## 2024-07-05 NOTE — TELEPHONE ENCOUNTER
Patient is  requesting medication refill. Please approve or deny this request.    Rx requested:  Requested Prescriptions     Pending Prescriptions Disp Refills    amphetamine-dextroamphetamine (ADDERALL, 10MG,) 10 MG tablet 60 tablet 0     Si-2 tablets daily in the afternoon as needed. No more then 2 tablets in a day    amphetamine-dextroamphetamine (ADDERALL XR) 20 MG extended release capsule 30 capsule 0     Sig: Take 1 capsule by mouth every morning for 30 days.         Last Office Visit:   2023      Next Visit Date:  Future Appointments   Date Time Provider Department Center   9/3/2024  3:00 PM Hari Avendaño MD [place] NEURO Neurology -

## 2024-07-24 DIAGNOSIS — F90.0 ATTENTION DEFICIT HYPERACTIVITY DISORDER (ADHD), PREDOMINANTLY INATTENTIVE TYPE: ICD-10-CM

## 2024-07-25 RX ORDER — DEXTROAMPHETAMINE SACCHARATE, AMPHETAMINE ASPARTATE, DEXTROAMPHETAMINE SULFATE AND AMPHETAMINE SULFATE 2.5; 2.5; 2.5; 2.5 MG/1; MG/1; MG/1; MG/1
TABLET ORAL
Qty: 60 TABLET | Refills: 0 | Status: SHIPPED | OUTPATIENT
Start: 2024-07-25 | End: 2024-09-03 | Stop reason: SDUPTHER

## 2024-07-25 RX ORDER — DEXTROAMPHETAMINE SACCHARATE, AMPHETAMINE ASPARTATE MONOHYDRATE, DEXTROAMPHETAMINE SULFATE AND AMPHETAMINE SULFATE 5; 5; 5; 5 MG/1; MG/1; MG/1; MG/1
20 CAPSULE, EXTENDED RELEASE ORAL EVERY MORNING
Qty: 30 CAPSULE | Refills: 0 | Status: SHIPPED | OUTPATIENT
Start: 2024-07-25 | End: 2024-09-03 | Stop reason: SDUPTHER

## 2024-09-03 ENCOUNTER — TELEPHONE (OUTPATIENT)
Dept: NEUROLOGY | Age: 34
End: 2024-09-03

## 2024-09-03 ENCOUNTER — OFFICE VISIT (OUTPATIENT)
Dept: NEUROLOGY | Age: 34
End: 2024-09-03
Payer: COMMERCIAL

## 2024-09-03 VITALS
DIASTOLIC BLOOD PRESSURE: 80 MMHG | BODY MASS INDEX: 35.99 KG/M2 | SYSTOLIC BLOOD PRESSURE: 130 MMHG | WEIGHT: 223 LBS | HEART RATE: 100 BPM

## 2024-09-03 DIAGNOSIS — G40.409 OTHER GENERALIZED EPILEPSY, NOT INTRACTABLE, WITHOUT STATUS EPILEPTICUS (HCC): ICD-10-CM

## 2024-09-03 DIAGNOSIS — F90.0 ATTENTION DEFICIT HYPERACTIVITY DISORDER (ADHD), PREDOMINANTLY INATTENTIVE TYPE: ICD-10-CM

## 2024-09-03 DIAGNOSIS — Z79.899 ENCOUNTER FOR LONG-TERM (CURRENT) USE OF HIGH-RISK MEDICATION: Primary | ICD-10-CM

## 2024-09-03 PROBLEM — G40.909 EPILEPSY (HCC): Status: ACTIVE | Noted: 2024-09-03

## 2024-09-03 PROCEDURE — 99214 OFFICE O/P EST MOD 30 MIN: CPT | Performed by: PSYCHIATRY & NEUROLOGY

## 2024-09-03 PROCEDURE — 1036F TOBACCO NON-USER: CPT | Performed by: PSYCHIATRY & NEUROLOGY

## 2024-09-03 PROCEDURE — G8417 CALC BMI ABV UP PARAM F/U: HCPCS | Performed by: PSYCHIATRY & NEUROLOGY

## 2024-09-03 PROCEDURE — G8427 DOCREV CUR MEDS BY ELIG CLIN: HCPCS | Performed by: PSYCHIATRY & NEUROLOGY

## 2024-09-03 RX ORDER — DEXTROAMPHETAMINE SACCHARATE, AMPHETAMINE ASPARTATE, DEXTROAMPHETAMINE SULFATE AND AMPHETAMINE SULFATE 2.5; 2.5; 2.5; 2.5 MG/1; MG/1; MG/1; MG/1
TABLET ORAL
Qty: 60 TABLET | Refills: 0 | Status: SHIPPED | OUTPATIENT
Start: 2024-09-03 | End: 2024-09-28

## 2024-09-03 RX ORDER — DEXTROAMPHETAMINE SACCHARATE, AMPHETAMINE ASPARTATE MONOHYDRATE, DEXTROAMPHETAMINE SULFATE AND AMPHETAMINE SULFATE 5; 5; 5; 5 MG/1; MG/1; MG/1; MG/1
20 CAPSULE, EXTENDED RELEASE ORAL EVERY MORNING
Qty: 30 CAPSULE | Refills: 0 | Status: SHIPPED | OUTPATIENT
Start: 2024-09-03 | End: 2024-10-03

## 2024-09-03 NOTE — TELEPHONE ENCOUNTER
Patient is having a hard time getting adderall XR 20 this month so she is just going to take her adderall IR 20 mg that she takes in the afternoon in the morning. And try again next month for the XR 20

## 2024-09-26 ENCOUNTER — APPOINTMENT (OUTPATIENT)
Dept: PRIMARY CARE | Facility: CLINIC | Age: 34
End: 2024-09-26
Payer: COMMERCIAL

## 2024-09-29 DIAGNOSIS — F90.0 ATTENTION DEFICIT HYPERACTIVITY DISORDER (ADHD), PREDOMINANTLY INATTENTIVE TYPE: ICD-10-CM

## 2024-09-30 RX ORDER — DEXTROAMPHETAMINE SACCHARATE, AMPHETAMINE ASPARTATE MONOHYDRATE, DEXTROAMPHETAMINE SULFATE AND AMPHETAMINE SULFATE 5; 5; 5; 5 MG/1; MG/1; MG/1; MG/1
20 CAPSULE, EXTENDED RELEASE ORAL EVERY MORNING
Qty: 30 CAPSULE | Refills: 0 | Status: SHIPPED | OUTPATIENT
Start: 2024-09-30 | End: 2024-11-02 | Stop reason: SDUPTHER

## 2024-09-30 RX ORDER — DEXTROAMPHETAMINE SACCHARATE, AMPHETAMINE ASPARTATE, DEXTROAMPHETAMINE SULFATE AND AMPHETAMINE SULFATE 2.5; 2.5; 2.5; 2.5 MG/1; MG/1; MG/1; MG/1
TABLET ORAL
Qty: 60 TABLET | Refills: 0 | Status: SHIPPED | OUTPATIENT
Start: 2024-09-30 | End: 2024-11-02 | Stop reason: SDUPTHER

## 2024-09-30 NOTE — TELEPHONE ENCOUNTER
Patient is  requesting medication refill. Please approve or deny this request.    Rx requested:  Requested Prescriptions     Pending Prescriptions Disp Refills    amphetamine-dextroamphetamine (ADDERALL, 10MG,) 10 MG tablet 60 tablet 0     Si-2 tablets daily in the afternoon as needed. No more then 2 tablets in a day    amphetamine-dextroamphetamine (ADDERALL XR) 20 MG extended release capsule 30 capsule 0     Sig: Take 1 capsule by mouth every morning for 30 days.         Last Office Visit:   9/3/2024      Next Visit Date:  Future Appointments   Date Time Provider Department Center   3/4/2025  1:45 PM Hari Avendaño MD [place] NEURO Neurology -

## 2024-10-30 ENCOUNTER — OFFICE VISIT (OUTPATIENT)
Dept: OBSTETRICS AND GYNECOLOGY | Facility: CLINIC | Age: 34
End: 2024-10-30
Payer: COMMERCIAL

## 2024-10-30 VITALS
BODY MASS INDEX: 39.55 KG/M2 | SYSTOLIC BLOOD PRESSURE: 131 MMHG | WEIGHT: 252 LBS | HEIGHT: 67 IN | DIASTOLIC BLOOD PRESSURE: 78 MMHG

## 2024-10-30 DIAGNOSIS — Z13.0 SCREENING FOR ENDOCRINE, METABOLIC AND IMMUNITY DISORDER: ICD-10-CM

## 2024-10-30 DIAGNOSIS — Z01.419 WELL WOMAN EXAM: Primary | ICD-10-CM

## 2024-10-30 DIAGNOSIS — Z12.4 CERVICAL CANCER SCREENING: ICD-10-CM

## 2024-10-30 DIAGNOSIS — R63.5 ABNORMAL WEIGHT GAIN: ICD-10-CM

## 2024-10-30 DIAGNOSIS — Z76.89 ENCOUNTER TO ESTABLISH CARE WITH NEW DOCTOR: ICD-10-CM

## 2024-10-30 DIAGNOSIS — N92.0 MENORRHAGIA WITH REGULAR CYCLE: ICD-10-CM

## 2024-10-30 DIAGNOSIS — Z11.51 ENCOUNTER FOR SCREENING FOR HUMAN PAPILLOMAVIRUS (HPV): ICD-10-CM

## 2024-10-30 DIAGNOSIS — Z13.29 SCREENING FOR ENDOCRINE, METABOLIC AND IMMUNITY DISORDER: ICD-10-CM

## 2024-10-30 DIAGNOSIS — Z13.228 SCREENING FOR ENDOCRINE, METABOLIC AND IMMUNITY DISORDER: ICD-10-CM

## 2024-10-30 PROCEDURE — 88175 CYTOPATH C/V AUTO FLUID REDO: CPT | Mod: TC,GCY | Performed by: OBSTETRICS & GYNECOLOGY

## 2024-10-30 PROCEDURE — 99395 PREV VISIT EST AGE 18-39: CPT | Performed by: OBSTETRICS & GYNECOLOGY

## 2024-10-30 PROCEDURE — 3008F BODY MASS INDEX DOCD: CPT | Performed by: OBSTETRICS & GYNECOLOGY

## 2024-10-30 PROCEDURE — 87624 HPV HI-RISK TYP POOLED RSLT: CPT | Performed by: OBSTETRICS & GYNECOLOGY

## 2024-10-30 RX ORDER — DEXTROAMPHETAMINE SACCHARATE, AMPHETAMINE ASPARTATE MONOHYDRATE, DEXTROAMPHETAMINE SULFATE AND AMPHETAMINE SULFATE 5; 5; 5; 5 MG/1; MG/1; MG/1; MG/1
20 CAPSULE, EXTENDED RELEASE ORAL
COMMUNITY
Start: 2024-09-30 | End: 2024-10-30

## 2024-10-30 RX ORDER — LEVETIRACETAM 500 MG/1
1000 TABLET ORAL 2 TIMES DAILY
COMMUNITY

## 2024-10-30 RX ORDER — IBUPROFEN 800 MG/1
800 TABLET ORAL EVERY 8 HOURS PRN
Qty: 90 TABLET | Refills: 3 | Status: SHIPPED | OUTPATIENT
Start: 2024-10-30 | End: 2025-01-28

## 2024-10-30 ASSESSMENT — LIFESTYLE VARIABLES
HOW OFTEN DO YOU HAVE A DRINK CONTAINING ALCOHOL: NEVER
AUDIT-C TOTAL SCORE: 0
HOW MANY STANDARD DRINKS CONTAINING ALCOHOL DO YOU HAVE ON A TYPICAL DAY: PATIENT DOES NOT DRINK
SKIP TO QUESTIONS 9-10: 1
HOW OFTEN DO YOU HAVE SIX OR MORE DRINKS ON ONE OCCASION: NEVER

## 2024-10-30 ASSESSMENT — ENCOUNTER SYMPTOMS
LOSS OF SENSATION IN FEET: 0
OCCASIONAL FEELINGS OF UNSTEADINESS: 0
DEPRESSION: 0

## 2024-10-30 ASSESSMENT — PATIENT HEALTH QUESTIONNAIRE - PHQ9
1. LITTLE INTEREST OR PLEASURE IN DOING THINGS: NOT AT ALL
SUM OF ALL RESPONSES TO PHQ9 QUESTIONS 1 & 2: 0
2. FEELING DOWN, DEPRESSED OR HOPELESS: NOT AT ALL

## 2024-10-30 ASSESSMENT — PAIN SCALES - GENERAL: PAINLEVEL_OUTOF10: 0-NO PAIN

## 2024-11-02 DIAGNOSIS — F90.0 ATTENTION DEFICIT HYPERACTIVITY DISORDER (ADHD), PREDOMINANTLY INATTENTIVE TYPE: ICD-10-CM

## 2024-11-02 DIAGNOSIS — R56.9 CONVULSIONS, UNSPECIFIED CONVULSION TYPE (HCC): ICD-10-CM

## 2024-11-04 RX ORDER — DEXTROAMPHETAMINE SACCHARATE, AMPHETAMINE ASPARTATE MONOHYDRATE, DEXTROAMPHETAMINE SULFATE AND AMPHETAMINE SULFATE 5; 5; 5; 5 MG/1; MG/1; MG/1; MG/1
20 CAPSULE, EXTENDED RELEASE ORAL EVERY MORNING
Qty: 30 CAPSULE | Refills: 0 | Status: SHIPPED | OUTPATIENT
Start: 2024-11-04 | End: 2024-12-06 | Stop reason: SDUPTHER

## 2024-11-04 RX ORDER — LEVETIRACETAM 500 MG/1
1000 TABLET ORAL 2 TIMES DAILY
Qty: 120 TABLET | Refills: 4 | Status: SHIPPED | OUTPATIENT
Start: 2024-11-04 | End: 2025-04-03

## 2024-11-04 RX ORDER — DEXTROAMPHETAMINE SACCHARATE, AMPHETAMINE ASPARTATE, DEXTROAMPHETAMINE SULFATE AND AMPHETAMINE SULFATE 2.5; 2.5; 2.5; 2.5 MG/1; MG/1; MG/1; MG/1
TABLET ORAL
Qty: 60 TABLET | Refills: 0 | Status: SHIPPED | OUTPATIENT
Start: 2024-11-04 | End: 2024-12-06 | Stop reason: SDUPTHER

## 2024-11-04 NOTE — TELEPHONE ENCOUNTER
Patient is  requesting medication refill. Please approve or deny this request.    Rx requested:  Requested Prescriptions     Pending Prescriptions Disp Refills    levETIRAcetam (KEPPRA) 500 MG tablet 120 tablet 4     Sig: Take 2 tablets by mouth 2 times daily    amphetamine-dextroamphetamine (ADDERALL, 10MG,) 10 MG tablet 60 tablet 0     Si-2 tablets daily in the afternoon as needed. No more then 2 tablets in a day         Last Office Visit:   9/3/2024      Next Visit Date:  Future Appointments   Date Time Provider Department Center   3/4/2025  1:45 PM Hari Avendaño MD [place] NEURO Neurology -

## 2024-11-04 NOTE — TELEPHONE ENCOUNTER
Patient Is  requesting medication refill. Please approve or deny this request.    Rx requested:  Requested Prescriptions     Pending Prescriptions Disp Refills    amphetamine-dextroamphetamine (ADDERALL XR) 20 MG extended release capsule 30 capsule 0     Sig: Take 1 capsule by mouth every morning for 30 days.         Last Office Visit:   9/3/2024      Next Visit Date:  Future Appointments   Date Time Provider Department Center   3/4/2025  1:45 PM Hari Avendaño MD [place] NEURO Neurology -

## 2024-11-07 ENCOUNTER — HOSPITAL ENCOUNTER (OUTPATIENT)
Dept: RADIOLOGY | Facility: HOSPITAL | Age: 34
Discharge: HOME | End: 2024-11-07
Payer: COMMERCIAL

## 2024-11-07 ENCOUNTER — LAB (OUTPATIENT)
Dept: LAB | Facility: LAB | Age: 34
End: 2024-11-07
Payer: COMMERCIAL

## 2024-11-07 DIAGNOSIS — D50.0 CHRONIC BLOOD LOSS ANEMIA: ICD-10-CM

## 2024-11-07 DIAGNOSIS — Z13.0 SCREENING FOR ENDOCRINE, METABOLIC AND IMMUNITY DISORDER: ICD-10-CM

## 2024-11-07 DIAGNOSIS — N92.0 MENORRHAGIA WITH REGULAR CYCLE: ICD-10-CM

## 2024-11-07 DIAGNOSIS — R63.5 ABNORMAL WEIGHT GAIN: ICD-10-CM

## 2024-11-07 DIAGNOSIS — Z13.228 SCREENING FOR ENDOCRINE, METABOLIC AND IMMUNITY DISORDER: ICD-10-CM

## 2024-11-07 DIAGNOSIS — Z13.29 SCREENING FOR ENDOCRINE, METABOLIC AND IMMUNITY DISORDER: ICD-10-CM

## 2024-11-07 LAB
ALBUMIN SERPL BCP-MCNC: 4.3 G/DL (ref 3.4–5)
ALP SERPL-CCNC: 59 U/L (ref 33–110)
ALT SERPL W P-5'-P-CCNC: 11 U/L (ref 7–45)
ANION GAP SERPL CALC-SCNC: 10 MMOL/L (ref 10–20)
AST SERPL W P-5'-P-CCNC: 15 U/L (ref 9–39)
BILIRUB SERPL-MCNC: 0.4 MG/DL (ref 0–1.2)
BUN SERPL-MCNC: 9 MG/DL (ref 6–23)
CALCIUM SERPL-MCNC: 8.8 MG/DL (ref 8.6–10.3)
CHLORIDE SERPL-SCNC: 105 MMOL/L (ref 98–107)
CHOLEST SERPL-MCNC: 136 MG/DL (ref 0–199)
CHOLESTEROL/HDL RATIO: 1.9
CO2 SERPL-SCNC: 26 MMOL/L (ref 21–32)
CREAT SERPL-MCNC: 0.71 MG/DL (ref 0.5–1.05)
EGFRCR SERPLBLD CKD-EPI 2021: >90 ML/MIN/1.73M*2
ERYTHROCYTE [DISTWIDTH] IN BLOOD BY AUTOMATED COUNT: 17.4 % (ref 11.5–14.5)
EST. AVERAGE GLUCOSE BLD GHB EST-MCNC: 88 MG/DL
FERRITIN SERPL-MCNC: 12 NG/ML (ref 8–150)
GLUCOSE SERPL-MCNC: 82 MG/DL (ref 74–99)
HBA1C MFR BLD: 4.7 %
HCT VFR BLD AUTO: 34 % (ref 36–46)
HDLC SERPL-MCNC: 72 MG/DL
HGB BLD-MCNC: 9.9 G/DL (ref 12–16)
MCH RBC QN AUTO: 19.3 PG (ref 26–34)
MCHC RBC AUTO-ENTMCNC: 29.1 G/DL (ref 32–36)
MCV RBC AUTO: 66 FL (ref 80–100)
NON-HDL CHOLESTEROL: 64 MG/DL (ref 0–149)
NRBC BLD-RTO: 0 /100 WBCS (ref 0–0)
PLATELET # BLD AUTO: 442 X10*3/UL (ref 150–450)
POTASSIUM SERPL-SCNC: 3.8 MMOL/L (ref 3.5–5.3)
PROT SERPL-MCNC: 6.8 G/DL (ref 6.4–8.2)
RBC # BLD AUTO: 5.13 X10*6/UL (ref 4–5.2)
SODIUM SERPL-SCNC: 137 MMOL/L (ref 136–145)
TSH SERPL-ACNC: 1.06 MIU/L (ref 0.44–3.98)
WBC # BLD AUTO: 5.4 X10*3/UL (ref 4.4–11.3)

## 2024-11-07 PROCEDURE — 84402 ASSAY OF FREE TESTOSTERONE: CPT

## 2024-11-07 PROCEDURE — 83540 ASSAY OF IRON: CPT

## 2024-11-07 PROCEDURE — 84443 ASSAY THYROID STIM HORMONE: CPT

## 2024-11-07 PROCEDURE — 82607 VITAMIN B-12: CPT

## 2024-11-07 PROCEDURE — 83718 ASSAY OF LIPOPROTEIN: CPT

## 2024-11-07 PROCEDURE — 82533 TOTAL CORTISOL: CPT

## 2024-11-07 PROCEDURE — 82627 DEHYDROEPIANDROSTERONE: CPT

## 2024-11-07 PROCEDURE — 80053 COMPREHEN METABOLIC PANEL: CPT

## 2024-11-07 PROCEDURE — 85027 COMPLETE CBC AUTOMATED: CPT

## 2024-11-07 PROCEDURE — 82728 ASSAY OF FERRITIN: CPT

## 2024-11-07 PROCEDURE — 83002 ASSAY OF GONADOTROPIN (LH): CPT

## 2024-11-07 PROCEDURE — 36415 COLL VENOUS BLD VENIPUNCTURE: CPT

## 2024-11-07 PROCEDURE — 76856 US EXAM PELVIC COMPLETE: CPT

## 2024-11-07 PROCEDURE — 82626 DEHYDROEPIANDROSTERONE: CPT

## 2024-11-07 PROCEDURE — 82465 ASSAY BLD/SERUM CHOLESTEROL: CPT

## 2024-11-07 PROCEDURE — 82746 ASSAY OF FOLIC ACID SERUM: CPT

## 2024-11-07 PROCEDURE — 83550 IRON BINDING TEST: CPT

## 2024-11-07 PROCEDURE — 84146 ASSAY OF PROLACTIN: CPT

## 2024-11-07 PROCEDURE — 83001 ASSAY OF GONADOTROPIN (FSH): CPT

## 2024-11-07 PROCEDURE — 83036 HEMOGLOBIN GLYCOSYLATED A1C: CPT

## 2024-11-08 LAB
CORTIS AM PEAK SERPL-MSCNC: 7.4 UG/DL (ref 5–20)
CYTOLOGY CMNT CVX/VAG CYTO-IMP: NORMAL
DHEA-S SERPL-MCNC: 123 UG/DL (ref 12–379)
FSH SERPL-ACNC: 9.7 IU/L
HPV HR 12 DNA GENITAL QL NAA+PROBE: NEGATIVE
HPV HR GENOTYPES PNL CVX NAA+PROBE: NEGATIVE
HPV16 DNA SPEC QL NAA+PROBE: NEGATIVE
HPV18 DNA SPEC QL NAA+PROBE: NEGATIVE
LAB AP CONTRACEPTIVE HISTORY: NORMAL
LAB AP HPV GENOTYPE QUESTION: YES
LAB AP HPV HR: NORMAL
LABORATORY COMMENT REPORT: NORMAL
LH SERPL-ACNC: 16.8 IU/L
PATH REPORT.TOTAL CANCER: NORMAL
PROLACTIN SERPL-MCNC: 11.3 UG/L (ref 3–20)

## 2024-11-09 ENCOUNTER — PATIENT MESSAGE (OUTPATIENT)
Dept: OBSTETRICS AND GYNECOLOGY | Facility: CLINIC | Age: 34
End: 2024-11-09
Payer: COMMERCIAL

## 2024-11-09 DIAGNOSIS — N92.0 MENORRHAGIA WITH REGULAR CYCLE: Primary | ICD-10-CM

## 2024-11-09 DIAGNOSIS — D50.0 CHRONIC BLOOD LOSS ANEMIA: ICD-10-CM

## 2024-11-09 LAB
FOLATE SERPL-MCNC: 10.5 NG/ML
IRON SATN MFR SERPL: ABNORMAL %
IRON SERPL-MCNC: 26 UG/DL (ref 35–150)
TIBC SERPL-MCNC: ABNORMAL UG/DL
UIBC SERPL-MCNC: >450 UG/DL (ref 110–370)
VIT B12 SERPL-MCNC: 370 PG/ML (ref 211–911)

## 2024-11-11 ENCOUNTER — TELEPHONE (OUTPATIENT)
Dept: OBSTETRICS AND GYNECOLOGY | Facility: CLINIC | Age: 34
End: 2024-11-11
Payer: COMMERCIAL

## 2024-11-11 DIAGNOSIS — N85.9 ENDOMETRIAL DISORDER: ICD-10-CM

## 2024-11-11 DIAGNOSIS — N83.202 CYST OF LEFT OVARY: Primary | ICD-10-CM

## 2024-11-11 DIAGNOSIS — D50.0 IRON DEFICIENCY ANEMIA DUE TO CHRONIC BLOOD LOSS: Primary | ICD-10-CM

## 2024-11-11 DIAGNOSIS — N92.0 MENORRHAGIA WITH REGULAR CYCLE: ICD-10-CM

## 2024-11-12 ENCOUNTER — OFFICE VISIT (OUTPATIENT)
Dept: OBSTETRICS AND GYNECOLOGY | Facility: CLINIC | Age: 34
End: 2024-11-12
Payer: COMMERCIAL

## 2024-11-12 VITALS — DIASTOLIC BLOOD PRESSURE: 80 MMHG | SYSTOLIC BLOOD PRESSURE: 127 MMHG | BODY MASS INDEX: 36.88 KG/M2 | WEIGHT: 232 LBS

## 2024-11-12 DIAGNOSIS — R93.89 ABNORMAL ULTRASOUND: ICD-10-CM

## 2024-11-12 DIAGNOSIS — N83.202 CYST OF LEFT OVARY: ICD-10-CM

## 2024-11-12 DIAGNOSIS — Z98.51 TUBAL LIGATION STATUS: ICD-10-CM

## 2024-11-12 DIAGNOSIS — N85.9 ENDOMETRIAL DISORDER: ICD-10-CM

## 2024-11-12 DIAGNOSIS — R63.5 ABNORMAL WEIGHT GAIN: ICD-10-CM

## 2024-11-12 DIAGNOSIS — N92.0 MENORRHAGIA WITH REGULAR CYCLE: Primary | ICD-10-CM

## 2024-11-12 PROCEDURE — 58100 BIOPSY OF UTERUS LINING: CPT | Performed by: OBSTETRICS & GYNECOLOGY

## 2024-11-12 PROCEDURE — 88305 TISSUE EXAM BY PATHOLOGIST: CPT | Mod: TC | Performed by: OBSTETRICS & GYNECOLOGY

## 2024-11-12 ASSESSMENT — PATIENT HEALTH QUESTIONNAIRE - PHQ9
2. FEELING DOWN, DEPRESSED OR HOPELESS: NOT AT ALL
1. LITTLE INTEREST OR PLEASURE IN DOING THINGS: NOT AT ALL
SUM OF ALL RESPONSES TO PHQ9 QUESTIONS 1 & 2: 0

## 2024-11-12 ASSESSMENT — PAIN SCALES - GENERAL: PAINLEVEL_OUTOF10: 0-NO PAIN

## 2024-11-12 NOTE — PROGRESS NOTES
"Patient ID: Valentina Arellano \"Afua" is a 34 y.o. female.    Endometrial biopsy    Date/Time: 11/12/2024 11:28 AM    Performed by: Cailin Cook DO  Authorized by: Cailin Cook DO    Consent:     Consent obtained: written    Consent given by: patient    Risks discussed: bleeding, infection, need for repeat procedure, pain, conversion to surgery and damage to other organs    Alternatives discussed: observation, referral and alternative treatment    Patient agrees, verbalizes understanding, and wants to proceed: yes      Procedure explained and questions answered to patient or proxy's satisfaction: yes    Indications:     Indications: other menstrual disorder    Pre-procedure:     Urine pregnancy test: N/A      Premeds: NSAID    Procedure:     A bimanual exam was performed: no      A bimanual exam was performed comment: Ultrasound reviewed    Prepped with: Betadine    Tenaculum used: yes      A local block was performed: no      Local anesthetic comment: Spray    Cervix dilated: no      Number of passes: 4  Findings:     Cervix: normal      Uterus depth by sound (cm): 10    Specimen collected: specimen collected and sent to pathology      Patient tolerance: tolerated well, no immediate complications  Comments:     Procedure comments: 35 yo F multiparous, s/p BTL with heavy cycles and anemia.  She had an abnormal US, see below.  EMB planned.      Reviewed pelvic ultrasound results: 1. heterogeneous and thickened endometrial stripe with echogenic areas of nodularity. 2. Septated likely physiologic left ovarian cyst. Follow-up can be obtained to document resolution.    After consent, the patient was prepped in lithotomy position via a SSE.  The bladder was straight catheterized during the prep. The Graves speculum was inserted and the voided before the procedure. The cervix was sprayed with a lidocaine spray and grasped at 12:00 with a tenaculum. The cervix was dilated gently to allow the insertion of the " endometrial pipelle.  After sounding, the pipelle was used to obtain endometrial tissue.  This was repeated for 3 total passes.  The 1st pass was mucous. The sample was sent to the lab. There was just a trace of blood loss. This terminated the procedure.   The patient was placed on pelvic rest for 1 week.  She will follow-up for results and treatment planning.    1. Menorrhagia with regular cycle     Endometrial biopsy   Surgical Pathology Exam   US PELVIS TRANSABDOMINAL WITH TRANSVAGINAL; Future    2. Abnormal weight gain     Endometrial biopsy   Surgical Pathology Exam    3. Cyst of left ovary     US PELVIS TRANSABDOMINAL WITH TRANSVAGINAL; Future    4. Endometrial disorder    -EMB today    5. Abnormal ultrasound    -FU ordered for 3 months.      1. Menorrhagia with regular cycle (Primary)    - Surgical Pathology Exam  - US PELVIS TRANSABDOMINAL WITH TRANSVAGINAL; Future    2. Abnormal weight gain    - Surgical Pathology Exam    3. Cyst of left ovary    - US PELVIS TRANSABDOMINAL WITH TRANSVAGINAL; Future

## 2024-11-13 DIAGNOSIS — N92.0 MENORRHAGIA WITH REGULAR CYCLE: ICD-10-CM

## 2024-11-13 DIAGNOSIS — D50.0 ANEMIA DUE TO BLOOD LOSS, CHRONIC: Primary | ICD-10-CM

## 2024-11-13 LAB
DHEA SERPL-MCNC: 1.83 NG/ML (ref 1.33–7.78)
TESTOSTERONE FREE (CHAN): 2.2 PG/ML (ref 0.1–6.4)
TESTOSTERONE,TOTAL,LC-MS/MS: 33 NG/DL (ref 2–45)

## 2024-11-14 LAB
LABORATORY COMMENT REPORT: NORMAL
PATH REPORT.FINAL DX SPEC: NORMAL
PATH REPORT.GROSS SPEC: NORMAL
PATH REPORT.RELEVANT HX SPEC: NORMAL
PATH REPORT.TOTAL CANCER: NORMAL

## 2024-12-06 DIAGNOSIS — F90.0 ATTENTION DEFICIT HYPERACTIVITY DISORDER (ADHD), PREDOMINANTLY INATTENTIVE TYPE: ICD-10-CM

## 2024-12-06 DIAGNOSIS — R56.9 CONVULSIONS, UNSPECIFIED CONVULSION TYPE (HCC): ICD-10-CM

## 2024-12-06 RX ORDER — DEXTROAMPHETAMINE SACCHARATE, AMPHETAMINE ASPARTATE, DEXTROAMPHETAMINE SULFATE AND AMPHETAMINE SULFATE 2.5; 2.5; 2.5; 2.5 MG/1; MG/1; MG/1; MG/1
TABLET ORAL
Qty: 60 TABLET | Refills: 0 | Status: SHIPPED | OUTPATIENT
Start: 2024-12-06 | End: 2025-01-04 | Stop reason: SDUPTHER

## 2024-12-06 RX ORDER — LEVETIRACETAM 500 MG/1
1000 TABLET ORAL 2 TIMES DAILY
Qty: 120 TABLET | Refills: 4 | Status: CANCELLED | OUTPATIENT
Start: 2024-12-06 | End: 2025-05-05

## 2024-12-06 RX ORDER — DEXTROAMPHETAMINE SACCHARATE, AMPHETAMINE ASPARTATE MONOHYDRATE, DEXTROAMPHETAMINE SULFATE AND AMPHETAMINE SULFATE 5; 5; 5; 5 MG/1; MG/1; MG/1; MG/1
20 CAPSULE, EXTENDED RELEASE ORAL EVERY MORNING
Qty: 30 CAPSULE | Refills: 0 | Status: SHIPPED | OUTPATIENT
Start: 2024-12-06 | End: 2025-01-04 | Stop reason: SDUPTHER

## 2024-12-06 NOTE — TELEPHONE ENCOUNTER
Requesting medication refill. Please approve or deny this request.    Rx requested:  Requested Prescriptions     Pending Prescriptions Disp Refills    amphetamine-dextroamphetamine (ADDERALL XR) 20 MG extended release capsule 30 capsule 0     Sig: Take 1 capsule by mouth every morning for 30 days.    levETIRAcetam (KEPPRA) 500 MG tablet 120 tablet 4     Sig: Take 2 tablets by mouth 2 times daily    amphetamine-dextroamphetamine (ADDERALL, 10MG,) 10 MG tablet 60 tablet 0     Si-2 tablets daily in the afternoon as needed. No more then 2 tablets in a day         Last Office Visit:   9/3/2024      Next Visit Date:  Future Appointments   Date Time Provider Department Center   3/4/2025  1:45 PM Hari Avendaño MD [place] NEURO Neurology -               Last refill 24. Please approve or deny.

## 2025-01-04 DIAGNOSIS — R56.9 CONVULSIONS, UNSPECIFIED CONVULSION TYPE (HCC): ICD-10-CM

## 2025-01-04 DIAGNOSIS — F90.0 ATTENTION DEFICIT HYPERACTIVITY DISORDER (ADHD), PREDOMINANTLY INATTENTIVE TYPE: ICD-10-CM

## 2025-01-06 RX ORDER — DEXTROAMPHETAMINE SACCHARATE, AMPHETAMINE ASPARTATE, DEXTROAMPHETAMINE SULFATE AND AMPHETAMINE SULFATE 2.5; 2.5; 2.5; 2.5 MG/1; MG/1; MG/1; MG/1
TABLET ORAL
Qty: 60 TABLET | Refills: 0 | Status: SHIPPED | OUTPATIENT
Start: 2025-01-06 | End: 2025-01-26

## 2025-01-06 RX ORDER — DEXTROAMPHETAMINE SACCHARATE, AMPHETAMINE ASPARTATE MONOHYDRATE, DEXTROAMPHETAMINE SULFATE AND AMPHETAMINE SULFATE 5; 5; 5; 5 MG/1; MG/1; MG/1; MG/1
20 CAPSULE, EXTENDED RELEASE ORAL EVERY MORNING
Qty: 30 CAPSULE | Refills: 0 | Status: SHIPPED | OUTPATIENT
Start: 2025-01-06 | End: 2025-02-05

## 2025-01-06 RX ORDER — LEVETIRACETAM 500 MG/1
1000 TABLET ORAL 2 TIMES DAILY
Qty: 120 TABLET | Refills: 0 | Status: SHIPPED | OUTPATIENT
Start: 2025-01-06 | End: 2025-06-05

## 2025-01-06 NOTE — TELEPHONE ENCOUNTER
Requesting medication refill. Please approve or deny this request.    Rx requested:  Requested Prescriptions     Pending Prescriptions Disp Refills    levETIRAcetam (KEPPRA) 500 MG tablet 120 tablet 4     Sig: Take 2 tablets by mouth 2 times daily    amphetamine-dextroamphetamine (ADDERALL XR) 20 MG extended release capsule 30 capsule 0     Sig: Take 1 capsule by mouth every morning for 30 days.    amphetamine-dextroamphetamine (ADDERALL, 10MG,) 10 MG tablet 60 tablet 0     Si-2 tablets daily in the afternoon as needed. No more then 2 tablets in a day         Last Office Visit:   9/3/2024      Next Visit Date:  Future Appointments   Date Time Provider Department Center   3/4/2025  1:45 PM Hari Avendaño MD [place] NEURO Neurology -               Last refill 24. Please approve or deny.

## 2025-02-03 DIAGNOSIS — F90.0 ATTENTION DEFICIT HYPERACTIVITY DISORDER (ADHD), PREDOMINANTLY INATTENTIVE TYPE: ICD-10-CM

## 2025-02-03 DIAGNOSIS — R56.9 CONVULSIONS, UNSPECIFIED CONVULSION TYPE (HCC): ICD-10-CM

## 2025-02-03 NOTE — TELEPHONE ENCOUNTER
Requesting medication refill. Please approve or deny this request.    Rx requested:  Requested Prescriptions     Pending Prescriptions Disp Refills    levETIRAcetam (KEPPRA) 500 MG tablet 120 tablet 0     Sig: Take 2 tablets by mouth 2 times daily         Last Office Visit:   Visit date not found      Next Visit Date:  Future Appointments   Date Time Provider Department Center   3/4/2025  1:45 PM Hari Avendaño MD [place] NEURO Neurology -               Last refill 1/6/25. Please approve or deny.

## 2025-02-03 NOTE — TELEPHONE ENCOUNTER
Requesting medication refill. Please approve or deny this request.    Rx requested:  Requested Prescriptions     Pending Prescriptions Disp Refills    amphetamine-dextroamphetamine (ADDERALL XR) 20 MG extended release capsule 30 capsule 0     Sig: Take 1 capsule by mouth every morning for 30 days.    amphetamine-dextroamphetamine (ADDERALL, 10MG,) 10 MG tablet 60 tablet 0     Si-2 tablets daily in the afternoon as needed. No more then 2 tablets in a day         Last Office Visit:   Visit date not found      Next Visit Date:  Future Appointments   Date Time Provider Department Center   3/4/2025  1:45 PM Hari Avendaño MD [place] NEURO Neurology -               Last refill 25. Please approve or deny.

## 2025-02-04 RX ORDER — LEVETIRACETAM 500 MG/1
1000 TABLET ORAL 2 TIMES DAILY
Qty: 120 TABLET | Refills: 0 | Status: SHIPPED | OUTPATIENT
Start: 2025-02-04 | End: 2025-07-04

## 2025-02-04 RX ORDER — DEXTROAMPHETAMINE SACCHARATE, AMPHETAMINE ASPARTATE, DEXTROAMPHETAMINE SULFATE AND AMPHETAMINE SULFATE 2.5; 2.5; 2.5; 2.5 MG/1; MG/1; MG/1; MG/1
TABLET ORAL
Qty: 60 TABLET | Refills: 0 | Status: SHIPPED | OUTPATIENT
Start: 2025-02-04 | End: 2025-03-18 | Stop reason: SDUPTHER

## 2025-02-04 RX ORDER — DEXTROAMPHETAMINE SACCHARATE, AMPHETAMINE ASPARTATE MONOHYDRATE, DEXTROAMPHETAMINE SULFATE AND AMPHETAMINE SULFATE 5; 5; 5; 5 MG/1; MG/1; MG/1; MG/1
20 CAPSULE, EXTENDED RELEASE ORAL EVERY MORNING
Qty: 30 CAPSULE | Refills: 0 | Status: SHIPPED | OUTPATIENT
Start: 2025-02-04 | End: 2025-03-18 | Stop reason: SDUPTHER

## 2025-02-10 ENCOUNTER — TELEPHONE (OUTPATIENT)
Dept: OBSTETRICS AND GYNECOLOGY | Facility: CLINIC | Age: 35
End: 2025-02-10
Payer: COMMERCIAL

## 2025-02-10 NOTE — TELEPHONE ENCOUNTER
Est pt last seen 11/2024 called into the office requesting Ibuprofen 800 mg rx renewal and also a topical cream for facial hair due to PCOS.     Nurse told pt a message with be sent to Dr. [facility], [place]

## 2025-02-15 DIAGNOSIS — L68.0 HIRSUTISM: Primary | ICD-10-CM

## 2025-02-15 DIAGNOSIS — N94.6 DYSMENORRHEA: ICD-10-CM

## 2025-02-15 RX ORDER — IBUPROFEN 800 MG/1
TABLET ORAL
Qty: 90 TABLET | Refills: 1 | Status: SHIPPED | OUTPATIENT
Start: 2025-02-15

## 2025-03-15 DIAGNOSIS — R56.9 CONVULSIONS, UNSPECIFIED CONVULSION TYPE (HCC): ICD-10-CM

## 2025-03-15 DIAGNOSIS — F90.0 ATTENTION DEFICIT HYPERACTIVITY DISORDER (ADHD), PREDOMINANTLY INATTENTIVE TYPE: ICD-10-CM

## 2025-03-17 RX ORDER — LEVETIRACETAM 500 MG/1
1000 TABLET ORAL 2 TIMES DAILY
Qty: 120 TABLET | Refills: 0 | OUTPATIENT
Start: 2025-03-17 | End: 2025-08-14

## 2025-03-17 RX ORDER — DEXTROAMPHETAMINE SACCHARATE, AMPHETAMINE ASPARTATE MONOHYDRATE, DEXTROAMPHETAMINE SULFATE AND AMPHETAMINE SULFATE 5; 5; 5; 5 MG/1; MG/1; MG/1; MG/1
20 CAPSULE, EXTENDED RELEASE ORAL EVERY MORNING
Qty: 30 CAPSULE | Refills: 0 | OUTPATIENT
Start: 2025-03-17 | End: 2025-04-16

## 2025-03-17 RX ORDER — DEXTROAMPHETAMINE SACCHARATE, AMPHETAMINE ASPARTATE, DEXTROAMPHETAMINE SULFATE AND AMPHETAMINE SULFATE 2.5; 2.5; 2.5; 2.5 MG/1; MG/1; MG/1; MG/1
TABLET ORAL
Qty: 60 TABLET | Refills: 0 | OUTPATIENT
Start: 2025-03-17 | End: 2025-04-03

## 2025-03-18 DIAGNOSIS — F90.0 ATTENTION DEFICIT HYPERACTIVITY DISORDER (ADHD), PREDOMINANTLY INATTENTIVE TYPE: ICD-10-CM

## 2025-03-18 NOTE — TELEPHONE ENCOUNTER
requesting medication refill. Please approve or deny this request.    Rx requested:  Requested Prescriptions     Pending Prescriptions Disp Refills    amphetamine-dextroamphetamine (ADDERALL XR) 20 MG extended release capsule 30 capsule 0     Sig: Take 1 capsule by mouth every morning for 30 days.    amphetamine-dextroamphetamine (ADDERALL, 10MG,) 10 MG tablet 60 tablet 0     Si-2 tablets daily in the afternoon as needed. No more then 2 tablets in a day         Last Office Visit:   9/3/2024      Next Visit Date:  Future Appointments   Date Time Provider Department Center   2025  3:15 PM Hari Avendaño MD [place] NEURO Neurology -

## 2025-03-19 RX ORDER — DEXTROAMPHETAMINE SACCHARATE, AMPHETAMINE ASPARTATE MONOHYDRATE, DEXTROAMPHETAMINE SULFATE AND AMPHETAMINE SULFATE 5; 5; 5; 5 MG/1; MG/1; MG/1; MG/1
20 CAPSULE, EXTENDED RELEASE ORAL EVERY MORNING
Qty: 30 CAPSULE | Refills: 0 | Status: SHIPPED | OUTPATIENT
Start: 2025-03-19 | End: 2025-04-29 | Stop reason: SDUPTHER

## 2025-03-19 RX ORDER — DEXTROAMPHETAMINE SACCHARATE, AMPHETAMINE ASPARTATE, DEXTROAMPHETAMINE SULFATE AND AMPHETAMINE SULFATE 2.5; 2.5; 2.5; 2.5 MG/1; MG/1; MG/1; MG/1
TABLET ORAL
Qty: 60 TABLET | Refills: 0 | Status: SHIPPED | OUTPATIENT
Start: 2025-03-19 | End: 2025-04-29 | Stop reason: SDUPTHER

## 2025-04-29 DIAGNOSIS — F90.0 ATTENTION DEFICIT HYPERACTIVITY DISORDER (ADHD), PREDOMINANTLY INATTENTIVE TYPE: ICD-10-CM

## 2025-04-29 DIAGNOSIS — R56.9 CONVULSIONS, UNSPECIFIED CONVULSION TYPE (HCC): ICD-10-CM

## 2025-04-29 RX ORDER — DEXTROAMPHETAMINE SACCHARATE, AMPHETAMINE ASPARTATE, DEXTROAMPHETAMINE SULFATE AND AMPHETAMINE SULFATE 2.5; 2.5; 2.5; 2.5 MG/1; MG/1; MG/1; MG/1
TABLET ORAL
Qty: 60 TABLET | Refills: 0 | Status: SHIPPED | OUTPATIENT
Start: 2025-04-29 | End: 2025-05-21 | Stop reason: SDUPTHER

## 2025-04-29 RX ORDER — DEXTROAMPHETAMINE SACCHARATE, AMPHETAMINE ASPARTATE MONOHYDRATE, DEXTROAMPHETAMINE SULFATE AND AMPHETAMINE SULFATE 5; 5; 5; 5 MG/1; MG/1; MG/1; MG/1
20 CAPSULE, EXTENDED RELEASE ORAL EVERY MORNING
Qty: 30 CAPSULE | Refills: 0 | Status: SHIPPED | OUTPATIENT
Start: 2025-04-29 | End: 2025-05-21 | Stop reason: SDUPTHER

## 2025-04-29 RX ORDER — LEVETIRACETAM 500 MG/1
1000 TABLET ORAL 2 TIMES DAILY
Qty: 120 TABLET | Refills: 0 | Status: SHIPPED | OUTPATIENT
Start: 2025-04-29 | End: 2025-05-21 | Stop reason: CLARIF

## 2025-04-29 NOTE — TELEPHONE ENCOUNTER
requesting medication refill. Please approve or deny this request.    Rx requested:  Requested Prescriptions     Pending Prescriptions Disp Refills    amphetamine-dextroamphetamine (ADDERALL XR) 20 MG extended release capsule 30 capsule 0     Sig: Take 1 capsule by mouth every morning for 30 days.    amphetamine-dextroamphetamine (ADDERALL, 10MG,) 10 MG tablet 60 tablet 0     Si-2 tablets daily in the afternoon as needed. No more then 2 tablets in a day    levETIRAcetam (KEPPRA) 500 MG tablet 120 tablet 0     Sig: Take 2 tablets by mouth 2 times daily         Last Office Visit:   9/3/2024      Next Visit Date:  Future Appointments   Date Time Provider Department Center   2025  3:15 PM Hari Avendaño MD [place] NEURO Neurology -

## 2025-05-21 ENCOUNTER — OFFICE VISIT (OUTPATIENT)
Age: 35
End: 2025-05-21
Payer: COMMERCIAL

## 2025-05-21 VITALS
SYSTOLIC BLOOD PRESSURE: 124 MMHG | HEART RATE: 83 BPM | WEIGHT: 226 LBS | DIASTOLIC BLOOD PRESSURE: 80 MMHG | BODY MASS INDEX: 36.48 KG/M2

## 2025-05-21 DIAGNOSIS — F90.0 ATTENTION DEFICIT HYPERACTIVITY DISORDER (ADHD), PREDOMINANTLY INATTENTIVE TYPE: Primary | ICD-10-CM

## 2025-05-21 DIAGNOSIS — G40.409 OTHER GENERALIZED EPILEPSY, NOT INTRACTABLE, WITHOUT STATUS EPILEPTICUS (HCC): ICD-10-CM

## 2025-05-21 DIAGNOSIS — G47.10 HYPERSOMNOLENT: ICD-10-CM

## 2025-05-21 PROCEDURE — G8417 CALC BMI ABV UP PARAM F/U: HCPCS | Performed by: PSYCHIATRY & NEUROLOGY

## 2025-05-21 PROCEDURE — G8427 DOCREV CUR MEDS BY ELIG CLIN: HCPCS | Performed by: PSYCHIATRY & NEUROLOGY

## 2025-05-21 PROCEDURE — 99213 OFFICE O/P EST LOW 20 MIN: CPT | Performed by: PSYCHIATRY & NEUROLOGY

## 2025-05-21 PROCEDURE — 99214 OFFICE O/P EST MOD 30 MIN: CPT | Performed by: PSYCHIATRY & NEUROLOGY

## 2025-05-21 PROCEDURE — 1036F TOBACCO NON-USER: CPT | Performed by: PSYCHIATRY & NEUROLOGY

## 2025-05-21 RX ORDER — DEXTROAMPHETAMINE SACCHARATE, AMPHETAMINE ASPARTATE, DEXTROAMPHETAMINE SULFATE AND AMPHETAMINE SULFATE 2.5; 2.5; 2.5; 2.5 MG/1; MG/1; MG/1; MG/1
TABLET ORAL
Qty: 60 TABLET | Refills: 0 | Status: SHIPPED | OUTPATIENT
Start: 2025-05-21 | End: 2025-06-08

## 2025-05-21 RX ORDER — DEXTROAMPHETAMINE SACCHARATE, AMPHETAMINE ASPARTATE MONOHYDRATE, DEXTROAMPHETAMINE SULFATE AND AMPHETAMINE SULFATE 5; 5; 5; 5 MG/1; MG/1; MG/1; MG/1
20 CAPSULE, EXTENDED RELEASE ORAL EVERY MORNING
Qty: 30 CAPSULE | Refills: 0 | Status: SHIPPED | OUTPATIENT
Start: 2025-05-21 | End: 2025-06-20

## 2025-05-21 RX ORDER — LEVETIRACETAM 1000 MG/1
1000 TABLET ORAL 2 TIMES DAILY
Qty: 180 TABLET | Refills: 3 | Status: SHIPPED | OUTPATIENT
Start: 2025-05-21

## 2025-05-21 NOTE — PROGRESS NOTES
Subjective:      Patient ID: Nicolasa Keene is a 34 y.o. female who presents today for:  Chief Complaint   Patient presents with    6 Month Follow-Up     Pt states things are good. No question or concern at this time        HPI 34-year-old right-handed female with a known history of ADHD and epilepsy.  Patient is on Adderall XR 20 mg with 10 mg in the afternoon.  Patient is on Keppra 1000 mg twice a day and she is always been on Keppra at that dose some of her refills went in to find milligrams twice a day.    Patient actually doing well on this combination without side effects though there was some concern about Adderall interact with hormones which I am not aware  Past Medical History:   Diagnosis Date    Seizures (HCC)      Past Surgical History:   Procedure Laterality Date    TONSILLECTOMY  1998     Social History     Socioeconomic History    Marital status: Single     Spouse name: Not on file    Number of children: Not on file    Years of education: Not on file    Highest education level: Not on file   Occupational History    Not on file   Tobacco Use    Smoking status: Never    Smokeless tobacco: Never   Substance and Sexual Activity    Alcohol use: No    Drug use: No    Sexual activity: Not on file   Other Topics Concern    Not on file   Social History Narrative    Not on file     Social Drivers of Health     Financial Resource Strain: Not on file   Food Insecurity: Not on file   Transportation Needs: Not on file   Physical Activity: Unknown (8/16/2024)    Exercise Vital Sign     Days of Exercise per Week: 0 days     Minutes of Exercise per Session: Not on file   Stress: Not on file   Social Connections: Not on file   Intimate Partner Violence: Not At Risk (10/30/2024)    Received from [facility]    Humiliation, Afraid, Rape, and Kick questionnaire     Fear of Current or Ex-Partner: No     Emotionally Abused: No     Physically Abused: No     Sexually Abused: No   Housing Stability:

## 2025-05-27 DIAGNOSIS — F90.0 ATTENTION DEFICIT HYPERACTIVITY DISORDER (ADHD), PREDOMINANTLY INATTENTIVE TYPE: ICD-10-CM

## 2025-05-27 RX ORDER — DEXTROAMPHETAMINE SACCHARATE, AMPHETAMINE ASPARTATE MONOHYDRATE, DEXTROAMPHETAMINE SULFATE AND AMPHETAMINE SULFATE 5; 5; 5; 5 MG/1; MG/1; MG/1; MG/1
20 CAPSULE, EXTENDED RELEASE ORAL EVERY MORNING
Qty: 30 CAPSULE | Refills: 0 | Status: CANCELLED | OUTPATIENT
Start: 2025-05-27 | End: 2025-06-26

## 2025-05-27 RX ORDER — DEXTROAMPHETAMINE SACCHARATE, AMPHETAMINE ASPARTATE, DEXTROAMPHETAMINE SULFATE AND AMPHETAMINE SULFATE 2.5; 2.5; 2.5; 2.5 MG/1; MG/1; MG/1; MG/1
TABLET ORAL
Qty: 60 TABLET | Refills: 0 | Status: CANCELLED | OUTPATIENT
Start: 2025-05-27 | End: 2025-06-14

## 2025-06-07 ENCOUNTER — HOSPITAL ENCOUNTER (EMERGENCY)
Age: 35
Discharge: HOME | End: 2025-06-07
Payer: COMMERCIAL

## 2025-06-07 VITALS
SYSTOLIC BLOOD PRESSURE: 139 MMHG | RESPIRATION RATE: 16 BRPM | DIASTOLIC BLOOD PRESSURE: 85 MMHG | OXYGEN SATURATION: 100 % | HEART RATE: 51 BPM | TEMPERATURE: 97.3 F

## 2025-06-07 DIAGNOSIS — D50.9 MICROCYTIC ANEMIA: ICD-10-CM

## 2025-06-07 DIAGNOSIS — R42 DIZZINESS: Primary | ICD-10-CM

## 2025-06-07 LAB
ACANTHOCYTES BLD QL SMEAR: ABNORMAL
ALBUMIN SERPL-MCNC: 4.6 G/DL (ref 3.5–4.6)
ALP SERPL-CCNC: 76 U/L (ref 40–130)
ALT SERPL-CCNC: 14 U/L (ref 0–33)
ANION GAP SERPL CALCULATED.3IONS-SCNC: 13 MEQ/L (ref 9–15)
AST SERPL-CCNC: 20 U/L (ref 0–35)
BACTERIA URNS QL MICRO: NEGATIVE /HPF
BASOPHILS # BLD: 0.1 K/UL (ref 0–0.1)
BASOPHILS NFR BLD: 0.9 % (ref 0.1–1.2)
BILIRUB SERPL-MCNC: 0.4 MG/DL (ref 0.2–0.7)
BILIRUB UR QL STRIP: ABNORMAL
BUN SERPL-MCNC: 9 MG/DL (ref 6–20)
CALCIUM SERPL-MCNC: 9.5 MG/DL (ref 8.5–9.9)
CHLORIDE SERPL-SCNC: 104 MEQ/L (ref 95–107)
CLARITY UR: CLEAR
CO2 SERPL-SCNC: 22 MEQ/L (ref 20–31)
COLOR UR: YELLOW
CREAT SERPL-MCNC: 0.6 MG/DL (ref 0.5–0.9)
EOSINOPHIL # BLD: 0.1 K/UL (ref 0–0.4)
EOSINOPHIL NFR BLD: 1.8 % (ref 0.7–5.8)
EPI CELLS #/AREA URNS HPF: ABNORMAL /HPF
ERYTHROCYTE [DISTWIDTH] IN BLOOD BY AUTOMATED COUNT: 17.2 % (ref 11.7–14.4)
GLOBULIN SER CALC-MCNC: 3.1 G/DL (ref 2.3–3.5)
GLUCOSE SERPL-MCNC: 91 MG/DL (ref 70–99)
GLUCOSE UR STRIP-MCNC: NEGATIVE MG/DL
HCG UR QL: NEGATIVE
HCT VFR BLD AUTO: 35.8 % (ref 37–47)
HGB BLD-MCNC: 10.6 G/DL (ref 11.2–15.7)
HGB UR QL STRIP: ABNORMAL
HYPOCHROMIA BLD QL SMEAR: ABNORMAL
IMM GRANULOCYTES # BLD: 0 K/UL
IMM GRANULOCYTES NFR BLD: 0.2 %
KETONES UR STRIP-MCNC: 15 MG/DL
LEUKOCYTE ESTERASE UR QL STRIP: NEGATIVE
LYMPHOCYTES # BLD: 1.1 K/UL (ref 1.2–3.7)
LYMPHOCYTES NFR BLD: 20.1 %
MCH RBC QN AUTO: 20 PG (ref 25.6–32.2)
MCHC RBC AUTO-ENTMCNC: 29.6 % (ref 32.2–35.5)
MCV RBC AUTO: 67.5 FL (ref 79.4–94.8)
MICROCYTES BLD QL SMEAR: ABNORMAL
MONOCYTES # BLD: 0.5 K/UL (ref 0.2–0.9)
MONOCYTES NFR BLD: 9.1 % (ref 4.7–12.5)
NEUTROPHILS # BLD: 3.8 K/UL (ref 1.6–6.1)
NEUTS SEG NFR BLD: 67.9 % (ref 34–71.1)
NITRITE UR QL STRIP: NEGATIVE
PH UR STRIP: 7 [PH] (ref 5–9)
PLATELET # BLD AUTO: 335 K/UL (ref 182–369)
PLATELET BLD QL SMEAR: ADEQUATE
POTASSIUM SERPL-SCNC: 4 MEQ/L (ref 3.4–4.9)
PROT SERPL-MCNC: 7.7 G/DL (ref 6.3–8)
PROT UR STRIP-MCNC: ABNORMAL MG/DL
RBC # BLD AUTO: 5.3 M/UL (ref 3.93–5.22)
RBC #/AREA URNS HPF: ABNORMAL /HPF (ref 0–2)
SLIDE REVIEW: ABNORMAL
SODIUM SERPL-SCNC: 139 MEQ/L (ref 135–144)
SP GR UR STRIP: 1.02 (ref 1–1.03)
URINE REFLEX TO CULTURE: ABNORMAL
UROBILINOGEN UR STRIP-ACNC: 0.2 E.U./DL
WBC # BLD AUTO: 5.6 K/UL (ref 4–10)
WBC #/AREA URNS HPF: ABNORMAL /HPF (ref 0–5)

## 2025-06-07 PROCEDURE — 83540 ASSAY OF IRON: CPT

## 2025-06-07 PROCEDURE — 36415 COLL VENOUS BLD VENIPUNCTURE: CPT

## 2025-06-07 PROCEDURE — 83550 IRON BINDING TEST: CPT

## 2025-06-07 PROCEDURE — 93005 ELECTROCARDIOGRAM TRACING: CPT

## 2025-06-07 PROCEDURE — 2580000003 HC RX 258

## 2025-06-07 PROCEDURE — 80053 COMPREHEN METABOLIC PANEL: CPT

## 2025-06-07 PROCEDURE — 84703 CHORIONIC GONADOTROPIN ASSAY: CPT

## 2025-06-07 PROCEDURE — 81001 URINALYSIS AUTO W/SCOPE: CPT

## 2025-06-07 PROCEDURE — 96374 THER/PROPH/DIAG INJ IV PUSH: CPT

## 2025-06-07 PROCEDURE — 6360000002 HC RX W HCPCS

## 2025-06-07 PROCEDURE — 99284 EMERGENCY DEPT VISIT MOD MDM: CPT

## 2025-06-07 PROCEDURE — 85025 COMPLETE CBC W/AUTO DIFF WBC: CPT

## 2025-06-07 RX ORDER — 0.9 % SODIUM CHLORIDE 0.9 %
1000 INTRAVENOUS SOLUTION INTRAVENOUS ONCE
Status: COMPLETED | OUTPATIENT
Start: 2025-06-07 | End: 2025-06-07

## 2025-06-07 RX ORDER — ONDANSETRON 4 MG/1
4 TABLET, ORALLY DISINTEGRATING ORAL 3 TIMES DAILY PRN
Qty: 12 TABLET | Refills: 0 | Status: SHIPPED | OUTPATIENT
Start: 2025-06-07 | End: 2025-06-11

## 2025-06-07 RX ORDER — ONDANSETRON 2 MG/ML
4 INJECTION INTRAMUSCULAR; INTRAVENOUS ONCE
Status: COMPLETED | OUTPATIENT
Start: 2025-06-07 | End: 2025-06-07

## 2025-06-07 RX ADMIN — SODIUM CHLORIDE 1000 ML: 0.9 INJECTION, SOLUTION INTRAVENOUS at 19:27

## 2025-06-07 RX ADMIN — ONDANSETRON 4 MG: 2 INJECTION, SOLUTION INTRAMUSCULAR; INTRAVENOUS at 18:20

## 2025-06-07 RX ADMIN — SODIUM CHLORIDE 1000 ML: 0.9 INJECTION, SOLUTION INTRAVENOUS at 18:19

## 2025-06-07 ASSESSMENT — ENCOUNTER SYMPTOMS
RHINORRHEA: 0
SHORTNESS OF BREATH: 0
EYES NEGATIVE: 1
SINUS PRESSURE: 0
NAUSEA: 1
COUGH: 0
SORE THROAT: 0
VOMITING: 1
ABDOMINAL PAIN: 0
WHEEZING: 0

## 2025-06-07 ASSESSMENT — PAIN - FUNCTIONAL ASSESSMENT
PAIN_FUNCTIONAL_ASSESSMENT: NONE - DENIES PAIN
PAIN_FUNCTIONAL_ASSESSMENT: NONE - DENIES PAIN

## 2025-06-07 ASSESSMENT — LIFESTYLE VARIABLES
HOW MANY STANDARD DRINKS CONTAINING ALCOHOL DO YOU HAVE ON A TYPICAL DAY: 3 OR 4
HOW OFTEN DO YOU HAVE A DRINK CONTAINING ALCOHOL: 2-3 TIMES A WEEK

## 2025-06-07 NOTE — ED TRIAGE NOTES
Patient to ED due to dizziness. Patient states her dizziness is worse when walking or moving her head. Patient states she vomited due to the dizziness. Patient is alert and oriented x4. Resp are regular and equal.

## 2025-06-07 NOTE — ED PROVIDER NOTES
[facility]  EMERGENCY DEPARTMENT ENCOUNTER      Pt Name: Nicolasa Keene  MRN: [id number]  Birthdate 1990  Date of evaluation: 6/7/2025  Provider: SOPHIA Webb CNP      HISTORY OF PRESENT ILLNESS    Nicolasa Keene is a 34 y.o. female who presents to the Emergency Department with reporting dizziness when moving symptoms starting in the middle of the night last night when she got up.  Also reporting pressure in left ear.  Patient denies any shortness of breath she denies any symptoms of pain in the ears throat or chest.  She denies any abdominal pain.  She gets nauseous when the dizziness comes on and feels sweaty.        REVIEW OF SYSTEMS       Review of Systems   Constitutional:  Negative for chills, fatigue and fever.   HENT:  Positive for ear pain. Negative for congestion, rhinorrhea, sinus pressure and sore throat.    Eyes: Negative.    Respiratory:  Negative for cough, shortness of breath and wheezing.    Cardiovascular: Negative.  Negative for chest pain.   Gastrointestinal:  Positive for nausea and vomiting. Negative for abdominal pain.   Endocrine: Negative.    Genitourinary:  Negative for dysuria.   Musculoskeletal:  Negative for myalgias.   Skin:  Negative for rash.   Neurological:  Positive for dizziness. Negative for weakness and light-headedness.   Hematological:  Negative for adenopathy.   Psychiatric/Behavioral: Negative.           PAST MEDICAL HISTORY     Past Medical History:   Diagnosis Date    Seizures (HCC)          SURGICAL HISTORY       Past Surgical History:   Procedure Laterality Date    TONSILLECTOMY  1998         CURRENT MEDICATIONS       Current Discharge Medication List        CONTINUE these medications which have NOT CHANGED    Details   amphetamine-dextroamphetamine (ADDERALL XR) 20 MG extended release capsule Take 1 capsule by mouth every morning for 30 days.  Qty: 30 capsule, Refills: 0    Associated Diagnoses: Attention deficit hyperactivity

## 2025-06-08 LAB
EKG ATRIAL RATE: 54 BPM
EKG DIAGNOSIS: NORMAL
EKG P AXIS: 49 DEGREES
EKG P-R INTERVAL: 136 MS
EKG Q-T INTERVAL: 486 MS
EKG QRS DURATION: 90 MS
EKG QTC CALCULATION (BAZETT): 460 MS
EKG R AXIS: 19 DEGREES
EKG T AXIS: 50 DEGREES
EKG VENTRICULAR RATE: 54 BPM

## 2025-06-08 PROCEDURE — 93010 ELECTROCARDIOGRAM REPORT: CPT | Performed by: INTERNAL MEDICINE

## 2025-06-09 LAB
IRON % SATURATION: 5 % (ref 20–55)
IRON: 27 UG/DL (ref 37–145)
TOTAL IRON BINDING CAPACITY: 516 UG/DL (ref 250–450)
UNSATURATED IRON BINDING CAPACITY: 489 UG/DL (ref 112–347)

## 2025-06-12 ENCOUNTER — TELEPHONE (OUTPATIENT)
Dept: OBSTETRICS AND GYNECOLOGY | Facility: CLINIC | Age: 35
End: 2025-06-12
Payer: COMMERCIAL

## 2025-06-12 NOTE — TELEPHONE ENCOUNTER
Est patient called asking for a Rx for Iron. She was seen in [facility] and her Iron is 27 and her HGB 10.6.     Patient from [facility] and needs PCP recommendations if you can suggest someone to manage her Anemia.   
Not applicable

## 2025-06-30 DIAGNOSIS — F90.0 ATTENTION DEFICIT HYPERACTIVITY DISORDER (ADHD), PREDOMINANTLY INATTENTIVE TYPE: ICD-10-CM

## 2025-06-30 NOTE — TELEPHONE ENCOUNTER
Patient is  requesting medication refill. Please approve or deny this request.    Rx requested:  Requested Prescriptions     Pending Prescriptions Disp Refills    amphetamine-dextroamphetamine (ADDERALL XR) 20 MG extended release capsule 30 capsule 0     Sig: Take 1 capsule by mouth every morning for 30 days.    amphetamine-dextroamphetamine (ADDERALL, 10MG,) 10 MG tablet 60 tablet 0     Si-2 tablets daily in the afternoon as needed. No more then 2 tablets in a day         Last Office Visit:   2025      Next Visit Date:  Future Appointments   Date Time Provider Department Center   2025  2:15 PM Hari Avendaño MD [place] NEURO Neurology -

## 2025-07-01 RX ORDER — DEXTROAMPHETAMINE SACCHARATE, AMPHETAMINE ASPARTATE, DEXTROAMPHETAMINE SULFATE AND AMPHETAMINE SULFATE 2.5; 2.5; 2.5; 2.5 MG/1; MG/1; MG/1; MG/1
TABLET ORAL
Qty: 60 TABLET | Refills: 0 | Status: SHIPPED | OUTPATIENT
Start: 2025-07-01 | End: 2025-07-18

## 2025-07-01 RX ORDER — DEXTROAMPHETAMINE SACCHARATE, AMPHETAMINE ASPARTATE MONOHYDRATE, DEXTROAMPHETAMINE SULFATE AND AMPHETAMINE SULFATE 5; 5; 5; 5 MG/1; MG/1; MG/1; MG/1
20 CAPSULE, EXTENDED RELEASE ORAL EVERY MORNING
Qty: 30 CAPSULE | Refills: 0 | Status: SHIPPED | OUTPATIENT
Start: 2025-07-01 | End: 2025-07-31